# Patient Record
Sex: FEMALE | Race: WHITE | Employment: FULL TIME | URBAN - METROPOLITAN AREA
[De-identification: names, ages, dates, MRNs, and addresses within clinical notes are randomized per-mention and may not be internally consistent; named-entity substitution may affect disease eponyms.]

---

## 2019-11-03 ENCOUNTER — HOSPITAL ENCOUNTER (OUTPATIENT)
Age: 69
Discharge: ANOTHER HOSPITAL | End: 2019-11-03
Attending: EMERGENCY MEDICINE
Payer: COMMERCIAL

## 2019-11-03 ENCOUNTER — APPOINTMENT (OUTPATIENT)
Dept: GENERAL RADIOLOGY | Facility: HOSPITAL | Age: 69
DRG: 308 | End: 2019-11-03
Attending: EMERGENCY MEDICINE
Payer: MEDICARE

## 2019-11-03 ENCOUNTER — HOSPITAL ENCOUNTER (INPATIENT)
Facility: HOSPITAL | Age: 69
LOS: 2 days | Discharge: HOME | DRG: 308 | End: 2019-11-05
Attending: EMERGENCY MEDICINE | Admitting: INTERNAL MEDICINE
Payer: MEDICARE

## 2019-11-03 VITALS
WEIGHT: 137 LBS | DIASTOLIC BLOOD PRESSURE: 60 MMHG | TEMPERATURE: 97 F | HEART RATE: 158 BPM | OXYGEN SATURATION: 98 % | RESPIRATION RATE: 28 BRPM | SYSTOLIC BLOOD PRESSURE: 81 MMHG

## 2019-11-03 DIAGNOSIS — I48.91 ATRIAL FIBRILLATION, NEW ONSET (HCC): Primary | ICD-10-CM

## 2019-11-03 DIAGNOSIS — I48.91 ATRIAL FIBRILLATION WITH RAPID VENTRICULAR RESPONSE (HCC): ICD-10-CM

## 2019-11-03 DIAGNOSIS — I48.91 ATRIAL FIBRILLATION WITH RAPID VENTRICULAR RESPONSE (HCC): Primary | ICD-10-CM

## 2019-11-03 PROBLEM — R73.9 HYPERGLYCEMIA: Status: ACTIVE | Noted: 2019-11-03

## 2019-11-03 PROBLEM — R79.89 AZOTEMIA: Status: ACTIVE | Noted: 2019-11-03

## 2019-11-03 PROCEDURE — 99204 OFFICE O/P NEW MOD 45 MIN: CPT

## 2019-11-03 PROCEDURE — 93010 ELECTROCARDIOGRAM REPORT: CPT

## 2019-11-03 PROCEDURE — 99221 1ST HOSP IP/OBS SF/LOW 40: CPT | Performed by: INTERNAL MEDICINE

## 2019-11-03 PROCEDURE — 71045 X-RAY EXAM CHEST 1 VIEW: CPT | Performed by: EMERGENCY MEDICINE

## 2019-11-03 RX ORDER — HEPARIN SODIUM 1000 [USP'U]/ML
60 INJECTION, SOLUTION INTRAVENOUS; SUBCUTANEOUS ONCE
Status: COMPLETED | OUTPATIENT
Start: 2019-11-03 | End: 2019-11-03

## 2019-11-03 RX ORDER — SODIUM CHLORIDE 0.9 % (FLUSH) 0.9 %
3 SYRINGE (ML) INJECTION AS NEEDED
Status: DISCONTINUED | OUTPATIENT
Start: 2019-11-03 | End: 2019-11-05

## 2019-11-03 RX ORDER — DILTIAZEM HYDROCHLORIDE 5 MG/ML
5 INJECTION INTRAVENOUS ONCE
Status: DISCONTINUED | OUTPATIENT
Start: 2019-11-03 | End: 2019-11-03

## 2019-11-03 RX ORDER — HEPARIN SODIUM AND DEXTROSE 10000; 5 [USP'U]/100ML; G/100ML
INJECTION INTRAVENOUS CONTINUOUS
Status: DISCONTINUED | OUTPATIENT
Start: 2019-11-03 | End: 2019-11-05

## 2019-11-03 RX ORDER — 0.9 % SODIUM CHLORIDE 0.9 %
1000 INTRAVENOUS SOLUTION INTRAVENOUS ONCE
Status: COMPLETED | OUTPATIENT
Start: 2019-11-03 | End: 2019-11-03

## 2019-11-03 RX ORDER — HEPARIN SODIUM AND DEXTROSE 10000; 5 [USP'U]/100ML; G/100ML
12 INJECTION INTRAVENOUS ONCE
Status: COMPLETED | OUTPATIENT
Start: 2019-11-03 | End: 2019-11-03

## 2019-11-03 RX ORDER — ACETAMINOPHEN 325 MG/1
650 TABLET ORAL EVERY 6 HOURS PRN
Status: DISCONTINUED | OUTPATIENT
Start: 2019-11-03 | End: 2019-11-05

## 2019-11-03 NOTE — ED INITIAL ASSESSMENT (HPI)
2 weeks ago thought she had allergies and would wheeze at night for the past week she has wheezing and shortness of breath when she lays down and she cant fall asleep. Occasional chest pain.  Apical pulse >150 weak and thready skin warm dry a/o x3

## 2019-11-03 NOTE — ED NOTES
911 called for transport to ed . ekg endorsed to [place] and cardiac monitor applied. Agrees to go to the ed fo further eval and treatment.

## 2019-11-03 NOTE — ED PROVIDER NOTES
Patient Seen in: [facility] AND [facility] Emergency Department      History   Patient presents with:  Arrythmia/Palpitations (cardiovascular)    Stated Complaint: a-fib; rvr    HPI    She presents the emergency department complaining of shortness of breath whil General: Skin is warm and dry. Findings: No rash. Neurological:      Mental Status: She is alert and oriented to person, place, and time.                ED Course     Labs Reviewed   BASIC METABOLIC PANEL (8) - Abnormal; Notable for the following com within normal limits   CBC W/ DIFFERENTIAL - Abnormal; Notable for the following components:    RDW-SD 47.0 (*)     All other components within normal limits   CBC W/ DIFFERENTIAL - Abnormal; Notable for the following components:    RDW-SD 46.5 (*)     All Documentation    There were greater than 30 minutes of critical care time spent directly on this patient and this time did not include procedures but did include:    8 minutes for documentation  9 minutes for physical exam, re-examination and discussing re

## 2019-11-03 NOTE — ED PROVIDER NOTES
Patient Seen in: [facility] AND CLINICS Immediate Care In [address] [place] [address]      History   Patient presents with:  Cough/URI    Stated Complaint: sob/cough    HPI    The patient is a 78-year-old female with no significant past medical history presents now with ronald irregular  Abdomen: Soft, nontender and nondistended  Neurologic: Patient is awake, alert and oriented ×3.   The patient's motor strength is 5 out of 5 and symmetric in the upper and lower extremities bilaterally  Extremities: No focal swelling or tendernes

## 2019-11-03 NOTE — ED INITIAL ASSESSMENT (HPI)
Pt BIBEMS from urgent care d/t a-fib; rvr. Pt seen at  with c/o SOB x1 week. Not seen by PMD since 1992. Denies cp, HA, dizziness, n/v/d/f/c, abd pain. Denies PMH  AOx3, RR even/nonlabored.

## 2019-11-04 ENCOUNTER — APPOINTMENT (OUTPATIENT)
Dept: CV DIAGNOSTICS | Facility: HOSPITAL | Age: 69
DRG: 308 | End: 2019-11-04
Attending: INTERNAL MEDICINE
Payer: MEDICARE

## 2019-11-04 PROCEDURE — 93306 TTE W/DOPPLER COMPLETE: CPT | Performed by: INTERNAL MEDICINE

## 2019-11-04 PROCEDURE — 99233 SBSQ HOSP IP/OBS HIGH 50: CPT | Performed by: HOSPITALIST

## 2019-11-04 RX ORDER — DILTIAZEM HYDROCHLORIDE 240 MG/1
240 CAPSULE, COATED, EXTENDED RELEASE ORAL DAILY
Status: DISCONTINUED | OUTPATIENT
Start: 2019-11-04 | End: 2019-11-04

## 2019-11-04 RX ORDER — FAMOTIDINE 10 MG
10 TABLET ORAL 2 TIMES DAILY
Status: DISCONTINUED | OUTPATIENT
Start: 2019-11-04 | End: 2019-11-05

## 2019-11-04 RX ORDER — DILTIAZEM HYDROCHLORIDE 60 MG/1
60 TABLET, FILM COATED ORAL EVERY 6 HOURS SCHEDULED
Status: DISCONTINUED | OUTPATIENT
Start: 2019-11-04 | End: 2019-11-05

## 2019-11-04 NOTE — PLAN OF CARE
Problem: Patient Centered Care  Goal: Patient preferences are identified and integrated in the patient's plan of care  Description  Interventions:  - What would you like us to know as we care for you? Prefers to be called \"Mary\".   - Provide timely, com

## 2019-11-04 NOTE — CONSULTS
[facility]    Report of Consultation    Peter Clarke Patient Status:  Inpatient    1950 MRN [id number]   Location [facility] 3W/SW Attending Robert Lai MD   Hosp Day # 1 PCP No primary care provider on file.      Raji HPI.    Physical Exam:   Blood pressure 130/81, pulse 85, temperature 98.2 °F (36.8 °C), temperature source Oral, resp. rate 18, height 154.9 cm (5' 1\"), weight 134 lb (60.8 kg), SpO2 98 %. Intake/Output:   Last 3 shifts: I/O last 3 completed shifts:   In both costophrenic angles. No large airspace consolidation. Follow-up chest PA and lateral would be of the of help however.     Dictated by (CST): Isabela Wood MD on 11/03/2019 at 16:37     Approved by (CST): Isabela Wood MD on 11/03/2019 at 16:38

## 2019-11-04 NOTE — PROGRESS NOTES
[facility]    Progress Note    Jesika Marcano Patient Status:  Inpatient    1950 MRN [id number]   Location [facility] 3W/SW Attending Sergio Davidson MD   Hosp Day # 1 PCP No primary care provider on file.        Subjective 12-lead    Result Date: 11/3/2019  ECG Report  Interpretation  -------------------------- Atrial fibrillation -Nonspecific ST depression -Nondiagnostic. Low voltage -possible pulmonary disease.  ABNORMAL No previous ECGs available Electronically signed on

## 2019-11-04 NOTE — H&P
[facility]    History & Physical    Feroz Tang Patient Status:  Inpatient    1950 MRN [id number]   Location [facility] 3W/SW Attending Alexandr Young MD   Hosp Day # 0 PCP No primary care provider on file.      Date: atraumatic. Neck:  Supple, non-tender, no carotid bruit, no jugular venous distention, no lymphadenopathy, no thyromegaly.   Respiratory:  Lungs are clear to auscultation, respirations are non-labored, breath sounds are equal, symmetrical chest wall expans scarring/atelectasis. Darren Ogles blunting of both costophrenic angles.  No large airspace consolidation.  Follow-up chest PA and lateral would be of the of help however.            Assessment and Plan:  Mary Ku is a(n) 77year old female,with no known

## 2019-11-04 NOTE — PAYOR COMM NOTE
--------------  ADMISSION REVIEW     Payor: BCJAMI PP  Subscriber #:  [id number]  Authorization Number: [id number]    Admit date: 11/3/19  Admit time: 65       Admitting Physician: Dewey Ware MD  Attending Physician:  Ruby Garcia MD  Primary Care Negative. Respiratory:  Negative  Cardiovascular: palpitations sob  Gastrointestinal:  Negative. Genitourinary:  Negative  Endocrine:  Negative. Immunologic:  Negative. Musculoskeletal:  Negative. Integumentary:  Negative. Neurologic:  Negative.   Psy 11/03/19 1638   Order Status: Completed Updated: 11/03/19 1639   Narrative:     PROCEDURE: XR CHEST AP PORTABLE (CPT=71010)  TIME: 1538.       COMPARISON: None.     INDICATIONS: SOB today.  History of afib.     TECHNIQUE:   Single view.       FINDINGS:   CA Intravenous Karen Castaneda, RN      CefTRIAXone Sodium (ROCEPHIN) 1 g in sodium chloride 0.9% 100 mL MBP/ADD-vantage     Date Action Dose Route User    11/4/2019 0250 New Bag 1 g Intravenous Katia Lam RN      heparin (PORCINE) drip [id number]/2 .0 11/04/2019     CREATSERUM 0.88 11/04/2019     BUN 21 (H) 11/04/2019      11/04/2019     K 4.3 11/04/2019      11/04/2019     CO2 24.0 11/04/2019      (H) 11/04/2019     CA 8.3 (L) 11/04/2019     ALB 3.5 11/04/2019     ALKPHO 49 MD  11/04/19    Date/Time Temp Pulse Resp BP SpO2 Weight O2 Device O2 Flow Rate (L/min) Who   11/04/19 0957 98.2 °F (36.8 °C) 85 18 130/81 98 % — None (Room air) — AS   11/04/19 0507 — — — — — 134 lb — — MG   11/04/19 0245 98.5 °F (36.9 °C) 78 18 114/90 97

## 2019-11-05 VITALS
RESPIRATION RATE: 18 BRPM | DIASTOLIC BLOOD PRESSURE: 71 MMHG | HEIGHT: 61 IN | SYSTOLIC BLOOD PRESSURE: 130 MMHG | OXYGEN SATURATION: 98 % | BODY MASS INDEX: 24.8 KG/M2 | HEART RATE: 105 BPM | WEIGHT: 131.38 LBS | TEMPERATURE: 98 F

## 2019-11-05 PROCEDURE — 99239 HOSP IP/OBS DSCHRG MGMT >30: CPT | Performed by: HOSPITALIST

## 2019-11-05 RX ORDER — METOPROLOL SUCCINATE 50 MG/1
50 TABLET, EXTENDED RELEASE ORAL
Status: DISCONTINUED | OUTPATIENT
Start: 2019-11-06 | End: 2019-11-05

## 2019-11-05 RX ORDER — METOPROLOL SUCCINATE 50 MG/1
50 TABLET, EXTENDED RELEASE ORAL
Qty: 30 TABLET | Refills: 0 | Status: SHIPPED | OUTPATIENT
Start: 2019-11-06

## 2019-11-05 RX ORDER — FUROSEMIDE 20 MG/1
20 TABLET ORAL 2 TIMES DAILY
Qty: 30 TABLET | Refills: 0 | Status: SHIPPED | OUTPATIENT
Start: 2019-11-05

## 2019-11-05 RX ORDER — FUROSEMIDE 10 MG/ML
20 INJECTION INTRAMUSCULAR; INTRAVENOUS
Status: DISCONTINUED | OUTPATIENT
Start: 2019-11-05 | End: 2019-11-05

## 2019-11-05 RX ORDER — HYDRALAZINE HYDROCHLORIDE 20 MG/ML
10 INJECTION INTRAMUSCULAR; INTRAVENOUS EVERY 4 HOURS PRN
Status: DISCONTINUED | OUTPATIENT
Start: 2019-11-05 | End: 2019-11-05

## 2019-11-05 RX ORDER — DILTIAZEM HYDROCHLORIDE 240 MG/1
240 CAPSULE, COATED, EXTENDED RELEASE ORAL DAILY
Qty: 30 CAPSULE | Refills: 0 | Status: SHIPPED | OUTPATIENT
Start: 2019-11-05

## 2019-11-05 RX ORDER — METOPROLOL SUCCINATE 50 MG/1
50 TABLET, EXTENDED RELEASE ORAL
Qty: 30 TABLET | Refills: 0 | Status: SHIPPED | OUTPATIENT
Start: 2019-11-06 | End: 2019-11-05

## 2019-11-05 RX ORDER — METOPROLOL SUCCINATE 25 MG/1
25 TABLET, EXTENDED RELEASE ORAL
Status: DISCONTINUED | OUTPATIENT
Start: 2019-11-05 | End: 2019-11-05

## 2019-11-05 RX ORDER — FUROSEMIDE 20 MG/1
20 TABLET ORAL 2 TIMES DAILY
Qty: 30 TABLET | Refills: 0 | Status: SHIPPED | OUTPATIENT
Start: 2019-11-05 | End: 2019-11-05

## 2019-11-05 RX ORDER — METOPROLOL SUCCINATE 25 MG/1
25 TABLET, EXTENDED RELEASE ORAL ONCE
Status: COMPLETED | OUTPATIENT
Start: 2019-11-05 | End: 2019-11-05

## 2019-11-05 NOTE — PLAN OF CARE
Problem: Patient Centered Care  Goal: Patient preferences are identified and integrated in the patient's plan of care  Description  Interventions:  - What would you like us to know as we care for you? Prefers to be called \"Mary\".   - Provide timely, com Progressing

## 2019-11-05 NOTE — PAYOR COMM NOTE
--------------  CONTINUED STAY REVIEW    Payor: TRINI PPO  Subscriber #:  [id number]  Authorization Number: [id number]    Admit date: 11/3/19  Admit time: 65    Admitting Physician: Janis Miller MD  Attending Physician:  Dominique Weber MD  Primary Care 10 mg, Oral, BID  CefTRIAXone Sodium (ROCEPHIN) 1 g in sodium chloride 0.9% 100 mL MBP/ADD-vantage, 1 g, Intravenous, Q24H  Rivaroxaban (XARELTO) tab 20 mg, 20 mg, Oral, Daily with food  dilTIAZem HCl (CARDIZEM) tab 60 mg, 60 mg, Oral, 4 times per day  dil Intravenous Q24H   • rivaroxaban  20 mg Oral Daily with food   • dilTIAZem HCl  60 mg Oral 4 times per day         Continuous Infusions:   • diltiazem Stopped (11/03/19 2100)   • Continuous dose Heparin infusion 600 Units/hr (11/05/19 0307)         Patient blunting of both costophrenic angles. No large airspace consolidation. Follow-up chest PA and lateral would be of the of help however.     Dictated by (CST): Juanito Mcneil MD on 11/03/2019 at 16:37     Approved by (CST): Juanito Mcneil MD on 11/03/2019 a Caroline  11/5/2019           *This note was dictated, in part, using a computerized recognition system and may contain unintended errors.               Electronically signed by Dorys Blair MD at 11/5/2019  1:59 PM           MEDICATIONS ADMINISTERED IN LAST 1

## 2019-11-05 NOTE — PLAN OF CARE
Problem: Patient Centered Care  Goal: Patient preferences are identified and integrated in the patient's plan of care  Description  Interventions:  - What would you like us to know as we care for you? Prefers to be called \"Mary\".   - Provide timely, com indicated  - Evaluate effectiveness of antiarrhythmic and heart rate control medications as ordered  - Initiate emergency measures for life threatening arrhythmias  - Monitor electrolytes and administer replacement therapy as ordered  Outcome: Progressing

## 2019-11-05 NOTE — PROGRESS NOTES
Cardiology progress note    24 h events VS stable          Current Medications:  hydrALAzine HCl (APRESOLINE) injection 10 mg, 10 mg, Intravenous, Q4H PRN  Metoprolol Succinate ER (Toprol XL) 24 hr tab 25 mg, 25 mg, Oral, Daily Beta Blocker  furosemide (LA 11/04/2019    K 4.3 11/04/2019     11/04/2019    CO2 24.0 11/04/2019     (H) 11/04/2019    CA 8.3 (L) 11/04/2019    ALB 3.5 11/04/2019    ALKPHO 49 (L) 11/04/2019    TP 7.3 11/04/2019    AST 50 (H) 11/04/2019    ALT 73 (H) 11/04/2019    PTT 56 see her as in or out patient, then we will plan for a OVIDIO and LHC as outpatient in case she dose not improve clinically the most important is to repeat an echo while she is rate controlled to better look at her MR and LVEF as outpatient.  Patient will disch

## 2019-11-05 NOTE — DISCHARGE SUMMARY
[facility]    Discharge Summary    Khoa Prater Patient Status:  Inpatient    1950 MRN [id number]   Location [facility] 3W/SW Attending Libra Nicole MD   Hosp Day # 2 PCP No primary care provider on file.      Date of CHF  bnp elevated- 1319  Echo reviewed  Lasix 20 IV BID  Lasix 20 mg daily      UTI  Mod esterase with wbcs  Infection likely a trigger for afib  Treat with iv ceftriaxone  Await urine cx- negative- stop antibiotics      Prophylaxis  Heparin drip and famot Please  your prescriptions at the location directed by your doctor or nurse    Bring a paper prescription for each of these medications  · dilTIAZem HCl ER Coated Beads 240 MG Cp24  · furosemide 20 MG Tabs  · Metoprolol Succinate ER 50 MG Tb24

## 2019-11-06 NOTE — PAYOR COMM NOTE
--------------  DISCHARGE REVIEW    Payor: TRINI BANGURA  Subscriber #:  [id number]  Authorization Number: [id number]    Admit date: 11/3/19  Admit time:  1700  Discharge Date: 11/5/2019  6:35 PM     Admitting Physician: Dawson Lawson MD  Attending Physician: rhonchi. Cardiovascular: S1, S2. Regular rate and rhythm. No murmurs, rubs or gallops. Abdomen: Soft, nontender, nondistended. Positive bowel sounds. No rebound or guarding. Neurologic: No focal neurological deficits.    Musculoskeletal: Moves all extr Cp24  Commonly known as:  CARDIZEM CD      Take 1 capsule (240 mg total) by mouth daily. Quantity:  30 capsule  Refills:  0     furosemide 20 MG Tabs  Commonly known as:  LASIX      Take 1 tablet (20 mg total) by mouth 2 (two) times daily.    Quantity:  3

## 2022-05-12 ENCOUNTER — OFFICE VISIT (OUTPATIENT)
Dept: INTERNAL MEDICINE CLINIC | Facility: CLINIC | Age: 72
End: 2022-05-12
Payer: COMMERCIAL

## 2022-05-12 VITALS
HEART RATE: 84 BPM | SYSTOLIC BLOOD PRESSURE: 134 MMHG | OXYGEN SATURATION: 98 % | HEIGHT: 61 IN | WEIGHT: 125.88 LBS | DIASTOLIC BLOOD PRESSURE: 80 MMHG | TEMPERATURE: 99 F | BODY MASS INDEX: 23.77 KG/M2

## 2022-05-12 DIAGNOSIS — I48.20 CHRONIC A-FIB (HCC): Primary | ICD-10-CM

## 2022-05-12 DIAGNOSIS — E78.2 MIXED HYPERLIPIDEMIA: ICD-10-CM

## 2022-05-12 DIAGNOSIS — R79.89 ELEVATED LFTS: ICD-10-CM

## 2022-05-12 DIAGNOSIS — I10 PRIMARY HYPERTENSION: ICD-10-CM

## 2022-05-12 DIAGNOSIS — Z78.0 MENOPAUSE: ICD-10-CM

## 2022-05-12 DIAGNOSIS — Z53.20 MAMMOGRAM DECLINED: ICD-10-CM

## 2022-05-12 DIAGNOSIS — Z53.20 COLON CANCER SCREENING DECLINED: ICD-10-CM

## 2022-05-12 DIAGNOSIS — R73.01 IFG (IMPAIRED FASTING GLUCOSE): ICD-10-CM

## 2022-05-12 PROCEDURE — 3075F SYST BP GE 130 - 139MM HG: CPT | Performed by: INTERNAL MEDICINE

## 2022-05-12 PROCEDURE — 90471 IMMUNIZATION ADMIN: CPT | Performed by: INTERNAL MEDICINE

## 2022-05-12 PROCEDURE — 99214 OFFICE O/P EST MOD 30 MIN: CPT | Performed by: INTERNAL MEDICINE

## 2022-05-12 PROCEDURE — 3008F BODY MASS INDEX DOCD: CPT | Performed by: INTERNAL MEDICINE

## 2022-05-12 PROCEDURE — 3079F DIAST BP 80-89 MM HG: CPT | Performed by: INTERNAL MEDICINE

## 2022-05-12 PROCEDURE — 90677 PCV20 VACCINE IM: CPT | Performed by: INTERNAL MEDICINE

## 2022-05-12 RX ORDER — ATORVASTATIN CALCIUM 40 MG/1
40 TABLET, FILM COATED ORAL DAILY
COMMUNITY
Start: 2022-03-21 | End: 2022-05-12

## 2022-05-12 RX ORDER — DILTIAZEM HYDROCHLORIDE 240 MG/1
240 CAPSULE, COATED, EXTENDED RELEASE ORAL DAILY
Qty: 90 CAPSULE | Refills: 1 | Status: SHIPPED | OUTPATIENT
Start: 2022-05-12

## 2022-05-12 RX ORDER — LOSARTAN POTASSIUM 50 MG/1
50 TABLET ORAL DAILY
Qty: 90 TABLET | Refills: 1 | Status: SHIPPED | OUTPATIENT
Start: 2022-05-12

## 2022-05-12 RX ORDER — METOPROLOL SUCCINATE 100 MG/1
100 TABLET, EXTENDED RELEASE ORAL DAILY
Qty: 90 TABLET | Refills: 1 | Status: SHIPPED | OUTPATIENT
Start: 2022-05-12

## 2022-05-12 RX ORDER — FUROSEMIDE 40 MG/1
40 TABLET ORAL DAILY
Qty: 90 TABLET | Refills: 1 | Status: SHIPPED | OUTPATIENT
Start: 2022-05-12

## 2022-05-12 RX ORDER — LOSARTAN POTASSIUM 50 MG/1
50 TABLET ORAL DAILY
COMMUNITY
Start: 2022-02-06 | End: 2022-05-12

## 2022-05-12 RX ORDER — ATORVASTATIN CALCIUM 40 MG/1
40 TABLET, FILM COATED ORAL DAILY
Qty: 90 TABLET | Refills: 1 | Status: SHIPPED | OUTPATIENT
Start: 2022-05-12

## 2022-09-15 LAB
ALBUMIN/GLOBULIN RATIO: 1.3 (CALC) (ref 1–2.5)
ALBUMIN: 4.5 G/DL (ref 3.6–5.1)
ALKALINE PHOSPHATASE: 50 U/L (ref 37–153)
ALT: 31 U/L (ref 6–29)
AST: 27 U/L (ref 10–35)
BILIRUBIN, TOTAL: 0.7 MG/DL (ref 0.2–1.2)
BUN: 17 MG/DL (ref 7–25)
CALCIUM: 9.7 MG/DL (ref 8.6–10.4)
CARBON DIOXIDE: 29 MMOL/L (ref 20–32)
CHLORIDE: 103 MMOL/L (ref 98–110)
CHOL/HDLC RATIO: 2.9 (CALC)
CHOLESTEROL, TOTAL: 137 MG/DL
CREATININE: 0.9 MG/DL (ref 0.6–1)
EGFR: 68 ML/MIN/1.73M2
GLOBULIN: 3.4 G/DL (CALC) (ref 1.9–3.7)
GLUCOSE: 102 MG/DL (ref 65–99)
HDL CHOLESTEROL: 47 MG/DL
HEMATOCRIT: 42.4 % (ref 35–45)
HEMOGLOBIN A1C: 5.8 % OF TOTAL HGB
HEMOGLOBIN: 14.1 G/DL (ref 11.7–15.5)
LDL-CHOLESTEROL: 65 MG/DL (CALC)
MCH: 30.8 PG (ref 27–33)
MCHC: 33.3 G/DL (ref 32–36)
MCV: 92.6 FL (ref 80–100)
MPV: 8.9 FL (ref 7.5–12.5)
NON-HDL CHOLESTEROL: 90 MG/DL (CALC)
PLATELET COUNT: 284 THOUSAND/UL (ref 140–400)
POTASSIUM: 5.2 MMOL/L (ref 3.5–5.3)
PROTEIN, TOTAL: 7.9 G/DL (ref 6.1–8.1)
RDW: 12.4 % (ref 11–15)
RED BLOOD CELL COUNT: 4.58 MILLION/UL (ref 3.8–5.1)
SODIUM: 140 MMOL/L (ref 135–146)
TRIGLYCERIDES: 178 MG/DL
WHITE BLOOD CELL COUNT: 8.4 THOUSAND/UL (ref 3.8–10.8)

## 2022-09-18 ENCOUNTER — TELEPHONE (OUTPATIENT)
Dept: INTERNAL MEDICINE CLINIC | Facility: CLINIC | Age: 72
End: 2022-09-18

## 2022-09-18 DIAGNOSIS — R79.89 ELEVATED LFTS: Primary | ICD-10-CM

## 2022-09-20 NOTE — TELEPHONE ENCOUNTER
Patient was left a message to call back. Transfer to triage dept [id number]  See result notes. Closing this encounter.

## 2022-10-03 ENCOUNTER — HOSPITAL ENCOUNTER (OUTPATIENT)
Dept: BONE DENSITY | Age: 72
Discharge: HOME | End: 2022-10-03
Attending: INTERNAL MEDICINE
Payer: COMMERCIAL

## 2022-10-03 ENCOUNTER — LAB ENCOUNTER (OUTPATIENT)
Dept: LAB | Age: 72
End: 2022-10-03
Attending: INTERNAL MEDICINE
Payer: COMMERCIAL

## 2022-10-03 DIAGNOSIS — R79.89 ELEVATED LFTS: Primary | ICD-10-CM

## 2022-10-03 DIAGNOSIS — Z78.0 MENOPAUSE: ICD-10-CM

## 2022-10-03 LAB
A1AT SERPL-MCNC: 124 MG/DL (ref 90–200)
CERULOPLASMIN SERPL-MCNC: 25.5 MG/DL (ref 20–60)
DEPRECATED HBV CORE AB SER IA-ACNC: 130.3 NG/ML
HBV CORE AB SERPL QL IA: NONREACTIVE
HBV SURFACE AB SER QL: NONREACTIVE
HBV SURFACE AB SERPL IA-ACNC: <3.1 MIU/ML
HBV SURFACE AG SER-ACNC: <0.1 [IU]/L
HBV SURFACE AG SERPL QL IA: NONREACTIVE
HCV AB SERPL QL IA: NONREACTIVE
IRON SATN MFR SERPL: 42 %
IRON SERPL-MCNC: 154 UG/DL
TIBC SERPL-MCNC: 368 UG/DL (ref 240–450)
TRANSFERRIN SERPL-MCNC: 247 MG/DL (ref 200–360)

## 2022-10-03 PROCEDURE — 82390 ASSAY OF CERULOPLASMIN: CPT | Performed by: INTERNAL MEDICINE

## 2022-10-03 PROCEDURE — 86364 TISS TRNSGLTMNASE EA IG CLAS: CPT | Performed by: INTERNAL MEDICINE

## 2022-10-03 PROCEDURE — 86704 HEP B CORE ANTIBODY TOTAL: CPT | Performed by: INTERNAL MEDICINE

## 2022-10-03 PROCEDURE — 86225 DNA ANTIBODY NATIVE: CPT

## 2022-10-03 PROCEDURE — 84466 ASSAY OF TRANSFERRIN: CPT | Performed by: INTERNAL MEDICINE

## 2022-10-03 PROCEDURE — 86256 FLUORESCENT ANTIBODY TITER: CPT

## 2022-10-03 PROCEDURE — 82103 ALPHA-1-ANTITRYPSIN TOTAL: CPT

## 2022-10-03 PROCEDURE — 86706 HEP B SURFACE ANTIBODY: CPT | Performed by: INTERNAL MEDICINE

## 2022-10-03 PROCEDURE — 36415 COLL VENOUS BLD VENIPUNCTURE: CPT | Performed by: INTERNAL MEDICINE

## 2022-10-03 PROCEDURE — 82728 ASSAY OF FERRITIN: CPT | Performed by: INTERNAL MEDICINE

## 2022-10-03 PROCEDURE — 87340 HEPATITIS B SURFACE AG IA: CPT | Performed by: INTERNAL MEDICINE

## 2022-10-03 PROCEDURE — 86038 ANTINUCLEAR ANTIBODIES: CPT

## 2022-10-03 PROCEDURE — 83540 ASSAY OF IRON: CPT | Performed by: INTERNAL MEDICINE

## 2022-10-03 PROCEDURE — 86803 HEPATITIS C AB TEST: CPT | Performed by: INTERNAL MEDICINE

## 2022-10-03 PROCEDURE — 77080 DXA BONE DENSITY AXIAL: CPT | Performed by: INTERNAL MEDICINE

## 2022-10-04 LAB
SMOOTH MUSCLE AB TITR SER: <20 {TITER}
TTG IGA SER-ACNC: 1.2 U/ML (ref ?–7)

## 2022-10-07 LAB
DSDNA IGG SERPL IA-ACNC: 2.1 IU/ML
ENA AB SER QL IA: 0.2 UG/L
ENA AB SER QL IA: NEGATIVE

## 2022-10-09 ENCOUNTER — TELEPHONE (OUTPATIENT)
Dept: INTERNAL MEDICINE CLINIC | Facility: CLINIC | Age: 72
End: 2022-10-09

## 2022-10-09 DIAGNOSIS — R79.89 ELEVATED LFTS: Primary | ICD-10-CM

## 2022-11-29 RX ORDER — ATORVASTATIN CALCIUM 40 MG/1
40 TABLET, FILM COATED ORAL DAILY
Qty: 90 TABLET | Refills: 1 | Status: SHIPPED | OUTPATIENT
Start: 2022-11-29

## 2022-11-29 NOTE — TELEPHONE ENCOUNTER
Refill passed per [address] protocol.      Requested Prescriptions   Pending Prescriptions Disp Refills    ATORVASTATIN 40 MG Oral Tab [Pharmacy Med Name: Atorvastatin Calcium 40 Mg Tab Nort] 90 tablet 0     Sig: TAKE ONE TABLET BY MOUTH ONE TIME DAILY       Cholesterol Medication Protocol Passed - 11/28/2022 11:26 AM        Passed - ALT in past 12 months        Passed - LDL in past 12 months        Passed - Last ALT < 80     Lab Results   Component Value Date    ALT 31 (H) 09/14/2022             Passed - Last LDL < 130     Lab Results   Component Value Date    LDL 65 09/14/2022             Passed - In person appointment or virtual visit in the past 12 mos or appointment in next 3 mos     [facility]              [facility]    [address] Arletta Men, MD    Office Visit          Future Appointments         Provider Department Appt Notes    In 1 month Osmin Cortes MD [address] [facility] follow up check                     Recent Outpatient Visits              [facility]    [address] Arletta Men, MD    Office Visit             Future Appointments         Provider Department Appt Notes    In 1 month Osmin Cortes MD [address] [facility] follow up check

## 2022-12-19 RX ORDER — DILTIAZEM HYDROCHLORIDE 240 MG/1
CAPSULE, COATED, EXTENDED RELEASE ORAL
Qty: 90 CAPSULE | Refills: 0 | Status: SHIPPED | OUTPATIENT
Start: 2022-12-19

## 2023-01-04 ENCOUNTER — OFFICE VISIT (OUTPATIENT)
Dept: INTERNAL MEDICINE CLINIC | Facility: CLINIC | Age: 73
End: 2023-01-04
Payer: COMMERCIAL

## 2023-01-04 VITALS
WEIGHT: 129.88 LBS | TEMPERATURE: 99 F | HEIGHT: 61 IN | DIASTOLIC BLOOD PRESSURE: 72 MMHG | HEART RATE: 90 BPM | OXYGEN SATURATION: 97 % | SYSTOLIC BLOOD PRESSURE: 121 MMHG | BODY MASS INDEX: 24.52 KG/M2

## 2023-01-04 DIAGNOSIS — Z12.11 COLON CANCER SCREENING: ICD-10-CM

## 2023-01-04 DIAGNOSIS — E78.2 MIXED HYPERLIPIDEMIA: ICD-10-CM

## 2023-01-04 DIAGNOSIS — I10 PRIMARY HYPERTENSION: ICD-10-CM

## 2023-01-04 DIAGNOSIS — I48.20 CHRONIC A-FIB (HCC): ICD-10-CM

## 2023-01-04 DIAGNOSIS — Z53.20 MAMMOGRAM DECLINED: ICD-10-CM

## 2023-01-04 DIAGNOSIS — R73.01 IFG (IMPAIRED FASTING GLUCOSE): ICD-10-CM

## 2023-01-04 DIAGNOSIS — M85.859 OSTEOPENIA OF NECK OF FEMUR, UNSPECIFIED LATERALITY: ICD-10-CM

## 2023-01-04 DIAGNOSIS — Z00.00 ANNUAL PHYSICAL EXAM: Primary | ICD-10-CM

## 2023-01-04 DIAGNOSIS — R79.89 ELEVATED LFTS: ICD-10-CM

## 2023-01-04 PROBLEM — Z78.0 MENOPAUSE: Status: RESOLVED | Noted: 2022-05-12 | Resolved: 2023-01-04

## 2023-01-04 PROBLEM — R73.9 HYPERGLYCEMIA: Status: RESOLVED | Noted: 2019-11-03 | Resolved: 2023-01-04

## 2023-01-04 PROCEDURE — 3074F SYST BP LT 130 MM HG: CPT | Performed by: INTERNAL MEDICINE

## 2023-01-04 PROCEDURE — 90471 IMMUNIZATION ADMIN: CPT | Performed by: INTERNAL MEDICINE

## 2023-01-04 PROCEDURE — 3078F DIAST BP <80 MM HG: CPT | Performed by: INTERNAL MEDICINE

## 2023-01-04 PROCEDURE — 99397 PER PM REEVAL EST PAT 65+ YR: CPT | Performed by: INTERNAL MEDICINE

## 2023-01-04 PROCEDURE — 90662 IIV NO PRSV INCREASED AG IM: CPT | Performed by: INTERNAL MEDICINE

## 2023-01-04 PROCEDURE — 3008F BODY MASS INDEX DOCD: CPT | Performed by: INTERNAL MEDICINE

## 2023-03-20 NOTE — TELEPHONE ENCOUNTER
Please review. Protocol failed / No protocol. Requested Prescriptions   Pending Prescriptions Disp Refills    DILTIAZEM  MG Oral Capsule SR 24 Hr [Pharmacy Med Name: Diltiazem Hydrochloride Er 24hr 240 Mg Cap Sunp] 90 capsule 0     Sig: TAKE ONE CAPSULE BY MOUTH ONE TIME DAILY       Hypertensive Medications Protocol Failed - 3/18/2023 10:46 AM        Failed - CMP or BMP in past 6 months     No results found for this or any previous visit (from the past 4392 hour(s)).             Failed - EGFRCR or GFRNAA > 50     GFR Evaluation            Passed - In person appointment in the past 12 or next 3 months     Recent Outpatient Visits              2 months ago Annual physical exam    [address], [place], Jerad Anderson MD    Office Visit    10 months ago [facility])    [address] [place], Jerad Anderson MD    Office Visit                      Passed - Last BP reading less than 140/90     BP Readings from Last 1 Encounters:  01/04/23 : 121/72              Passed - In person appointment or virtual visit in the past 6 months     Recent Outpatient Visits              2 months ago Annual physical exam    Jerad Lopez MD    Office Visit    10 months ago [facility])    Jerad Lopez MD    Office Visit                            Recent Outpatient Visits              2 months ago Annual physical exam    Jerad Lopez MD    Office Visit    10 months ago [facility])    [address], [place], Jerad Anderson MD    Office Visit

## 2023-03-21 RX ORDER — DILTIAZEM HYDROCHLORIDE 240 MG/1
CAPSULE, COATED, EXTENDED RELEASE ORAL
Qty: 90 CAPSULE | Refills: 0 | Status: SHIPPED | OUTPATIENT
Start: 2023-03-21

## 2023-05-03 RX ORDER — LOSARTAN POTASSIUM 50 MG/1
TABLET ORAL
Qty: 90 TABLET | Refills: 0 | Status: SHIPPED | OUTPATIENT
Start: 2023-05-03

## 2023-05-03 RX ORDER — METOPROLOL SUCCINATE 100 MG/1
TABLET, EXTENDED RELEASE ORAL
Qty: 90 TABLET | Refills: 0 | Status: SHIPPED | OUTPATIENT
Start: 2023-05-03

## 2023-05-03 RX ORDER — FUROSEMIDE 40 MG/1
TABLET ORAL
Qty: 90 TABLET | Refills: 0 | Status: SHIPPED | OUTPATIENT
Start: 2023-05-03

## 2023-06-06 RX ORDER — DILTIAZEM HYDROCHLORIDE 240 MG/1
240 CAPSULE, COATED, EXTENDED RELEASE ORAL DAILY
Qty: 90 CAPSULE | Refills: 1 | Status: SHIPPED | OUTPATIENT
Start: 2023-06-06

## 2023-06-06 NOTE — TELEPHONE ENCOUNTER
Protocol failed or has No Protocol, please review  Requested Prescriptions   Pending Prescriptions Disp Refills    DILTIAZEM  MG Oral Capsule SR 24 Hr [Pharmacy Med Name: Diltiazem Hydrochloride Er 24hr 240 Mg Cap Sunp] 90 capsule 0     Sig: TAKE ONE CAPSULE BY MOUTH ONE TIME DAILY       Hypertensive Medications Protocol Failed - 6/5/2023  2:17 PM        Failed - CMP or BMP in past 6 months     No results found for this or any previous visit (from the past 4392 hour(s)).               Passed - In person appointment in the past 12 or next 3 months     Recent Outpatient Visits              5 months ago Annual physical exam    Jeff Gonzalez MD    Office Visit    1 year ago [facility])    [address] Fonsec[address] Shelva Memo, MD    Office Visit                      Passed - Last BP reading less than 140/90     BP Readings from Last 1 Encounters:  01/04/23 : 121/72                Passed - In person appointment or virtual visit in the past 6 months     Recent Outpatient Visits              5 months ago Annual physical exam    Jeff Gonzalez MD    Office Visit    1 year ago [facility])    [place] Fonsec[address] Shelva Memo, MD    Office Visit                      Passed - EGFRCR or GFRNAA > 50     GFR Evaluation                   Recent Outpatient Visits              5 months ago Annual physical exam    Jeff Gonzalez MD    Office Visit    1 year ago [facility])    [address] Fon[address] Shelva Memo, MD    Office Visit

## 2023-07-03 RX ORDER — ATORVASTATIN CALCIUM 40 MG/1
40 TABLET, FILM COATED ORAL DAILY
Qty: 90 TABLET | Refills: 3 | Status: SHIPPED | OUTPATIENT
Start: 2023-07-03

## 2023-07-19 ENCOUNTER — NURSE TRIAGE (OUTPATIENT)
Dept: INTERNAL MEDICINE CLINIC | Facility: CLINIC | Age: 73
End: 2023-07-19

## 2023-07-19 NOTE — TELEPHONE ENCOUNTER
Agree/ she needs to be seen at least go to IC; not all swelling in the joint like the knee is gout. Could be other types of athritis, the worse could be septic joint or infection of the joint that can present similarly. She can not  take nsaids since she is on xarelto.

## 2023-07-19 NOTE — TELEPHONE ENCOUNTER
Please reply to pool: EM RN TRIAGE  Action Requested: Summary for Provider     []  Critical Lab, Recommendations Needed  [x] Need Additional Advice  []   FYI    []   Need Orders  [] Need Medications Sent to Pharmacy  []  Other     SUMMARY: Patient contacts clinic reporting left knee pain, redness and swelling. She believes she has gout based on internet search of symptoms. She is requesting medications to \"lower my uric acid level\". Patient has never been diagnosed with gout. Swelling is localized to knee, denies leg swelling, chest pain, shortness of breath, fever, body aches or chills. I advised the patient she needs to be seen, she adamantly refuses appointment. States she cannot ambulate due to pain, she has to walk with a cane. I advised multiple times that her symptoms need to be assessed. She continues to refuse. She will continue with OTC advil for her symptoms. Dr. Abram Gomez.     Reason for call: Acute  Onset: Data Unavailable                       Reason for Disposition   SEVERE pain (e.g., excruciating, unable to walk)    Protocols used: Knee Pain-A-[place]

## 2023-07-19 NOTE — TELEPHONE ENCOUNTER
Informed patient of advice per Dr. Jim Krueger.  States the Advil \"is helping my pain. \"  Reiterated that she is not to take as it interferes with Xarelto. Patient states her pain is \"not that bad today\" and denies wanting to go to ER or IC. Offered appointment this week with a provider, but refused.

## 2023-07-31 RX ORDER — LOSARTAN POTASSIUM 50 MG/1
50 TABLET ORAL EVERY MORNING
Qty: 90 TABLET | Refills: 0 | Status: SHIPPED | OUTPATIENT
Start: 2023-07-31

## 2023-07-31 RX ORDER — FUROSEMIDE 40 MG/1
40 TABLET ORAL EVERY MORNING
Qty: 90 TABLET | Refills: 0 | Status: SHIPPED | OUTPATIENT
Start: 2023-07-31

## 2023-07-31 RX ORDER — METOPROLOL SUCCINATE 100 MG/1
100 TABLET, EXTENDED RELEASE ORAL EVERY MORNING
Qty: 90 TABLET | Refills: 0 | Status: SHIPPED | OUTPATIENT
Start: 2023-07-31

## 2023-07-31 NOTE — TELEPHONE ENCOUNTER
Please review. Protocol failed / Has no protocol. Requested Prescriptions   Pending Prescriptions Disp Refills    METOPROLOL SUCCINATE  MG Oral Tablet 24 Hr [Pharmacy Med Name: Metoprolol Succinate Er 24hr 100 Mg Tab Nort] 90 tablet 0     Sig: Take 1 tablet  by mouth in the morning       Hypertensive Medications Protocol Failed - 7/29/2023 10:34 AM        Failed - CMP or BMP in past 6 months     No results found for this or any previous visit (from the past 4392 hour(s)). Failed - In person appointment or virtual visit in the past 6 months     Recent Outpatient Visits              6 months ago Annual physical exam    [address] [place] [address], Bethanie Cabot, MD    Office Visit    1 year ago [facility])    [place], Bethanie Cabot, MD    Office Visit                      Passed - In person appointment in the past 12 or next 3 months     Recent Outpatient Visits              6 months ago Annual physical exam    [place], Bethanie Cabot, MD    Office Visit    1 year ago [facility])    [place] Willis, Bethanie Cabot, MD    Office Visit                      Passed - Last BP reading less than 140/90     BP Readings from Last 1 Encounters:  01/04/23 : 121/72              Passed - EGFRCR or GFRNAA > 50     GFR Evaluation              FUROSEMIDE 40 MG Oral Tab [Pharmacy Med Name: Furosemide 40 Mg Tab Solc] 90 tablet 0     Sig: Take 1 tablet by mouth in the morning. Hypertensive Medications Protocol Failed - 7/29/2023 10:34 AM        Failed - CMP or BMP in past 6 months     No results found for this or any previous visit (from the past 4392 hour(s)).             Failed - In person appointment or virtual visit in the past 6 months     Recent Outpatient Visits              6 months ago Annual physical [address]    Office Visit    1 year ago [facility])    [address], [place] [address], Lynette Lou MD    Office Visit                      Passed - In person appointment in the past 12 or next 3 months     Recent Outpatient Visits              6 months ago Annual physical exam    [address], [address], Lynette Lou MD    Office Visit    1 year ago [facility])    [address], [address] [address], Lynette Lou MD    Office Visit                      Passed - Last BP reading less than 140/90     BP Readings from Last 1 Encounters:  01/04/23 : 121/72              Passed - EGFRCR or GFRNAA > 50     GFR Evaluation              LOSARTAN 50 MG Oral Tab [Pharmacy Med Name: Losartan Potassium 50 Mg Tab Camb] 90 tablet 0     Sig: Take 1 tablet by mouth in the morning. Hypertensive Medications Protocol Failed - 7/29/2023 10:34 AM        Failed - CMP or BMP in past 6 months     No results found for this or any previous visit (from the past 4392 hour(s)).             Failed - In person appointment or virtual visit in the past 6 months     Recent Outpatient Visits              6 months ago Annual physical exam    [address] MD    Office Visit    1 year ago [facility])    [address] MD    Office Visit                      Passed - In person appointment in the past 12 or next 3 months     Recent Outpatient Visits              6 months ago Annual physical exam    [address] Zulu[address] MD    Office Visit    1 year ago [facility])    [address] Z[address] MD    Office Visit                      Passed - Last BP reading less than 140/90     BP Readings from Last 1 Encounters:  01/04/23 : 121/72              Passed - EGFRCR or GFRNAA > 50     GFR Evaluation                  Recent Outpatient Visits              6 months ago Annual physical exam    Kristal Boyle MD    Office Visit    1 year ago Chronic a-fib [facility])    Kristal Boyle MD    Office Visit

## 2023-09-01 ENCOUNTER — OFFICE VISIT (OUTPATIENT)
Dept: INTERNAL MEDICINE CLINIC | Facility: CLINIC | Age: 73
End: 2023-09-01

## 2023-09-01 VITALS
HEART RATE: 61 BPM | HEIGHT: 61 IN | BODY MASS INDEX: 23.2 KG/M2 | WEIGHT: 122.88 LBS | OXYGEN SATURATION: 99 % | TEMPERATURE: 99 F | DIASTOLIC BLOOD PRESSURE: 76 MMHG | SYSTOLIC BLOOD PRESSURE: 126 MMHG

## 2023-09-01 DIAGNOSIS — R73.01 IFG (IMPAIRED FASTING GLUCOSE): ICD-10-CM

## 2023-09-01 DIAGNOSIS — M85.859 OSTEOPENIA OF NECK OF FEMUR, UNSPECIFIED LATERALITY: ICD-10-CM

## 2023-09-01 DIAGNOSIS — Z53.20 MAMMOGRAM DECLINED: ICD-10-CM

## 2023-09-01 DIAGNOSIS — I48.20 CHRONIC A-FIB (HCC): ICD-10-CM

## 2023-09-01 DIAGNOSIS — R79.89 ELEVATED LFTS: ICD-10-CM

## 2023-09-01 DIAGNOSIS — I10 PRIMARY HYPERTENSION: Primary | ICD-10-CM

## 2023-09-01 DIAGNOSIS — E78.2 MIXED HYPERLIPIDEMIA: ICD-10-CM

## 2023-09-01 DIAGNOSIS — M10.062 ACUTE IDIOPATHIC GOUT OF LEFT KNEE: ICD-10-CM

## 2023-09-01 DIAGNOSIS — I34.0 MITRAL VALVE INSUFFICIENCY, UNSPECIFIED ETIOLOGY: ICD-10-CM

## 2023-09-01 PROCEDURE — 3078F DIAST BP <80 MM HG: CPT | Performed by: INTERNAL MEDICINE

## 2023-09-01 PROCEDURE — 3008F BODY MASS INDEX DOCD: CPT | Performed by: INTERNAL MEDICINE

## 2023-09-01 PROCEDURE — 99214 OFFICE O/P EST MOD 30 MIN: CPT | Performed by: INTERNAL MEDICINE

## 2023-09-01 PROCEDURE — 3074F SYST BP LT 130 MM HG: CPT | Performed by: INTERNAL MEDICINE

## 2023-10-27 NOTE — TELEPHONE ENCOUNTER
Please review; protocol failed. Message sent for patient to have labs done. Requested Prescriptions   Pending Prescriptions Disp Refills    LOSARTAN 50 MG Oral Tab [Pharmacy Med Name: Losartan Potassium 50 Mg Tab Camb] 90 tablet 0     Sig: TAKE 1 TABLET BY MOUTH EVERY MORNING       Hypertensive Medications Protocol Failed - 10/26/2023  9:51 AM        Failed - CMP or BMP in past 6 months     No results found for this or any previous visit (from the past 4392 hour(s)).             Failed - EGFRCR or GFRNAA > 50     GFR Evaluation            Passed - In person appointment in the past 12 or next 3 months     Recent Outpatient Visits              1 month ago Primary hypertension    Rowan Gómez Edrick Servant, MD    Office Visit    9 months ago Annual physical exam    Raza Vela MD    Office Visit    [facility]    Rowan Gómez Edrick Servant, MD    Office Visit                      Passed - Last BP reading less than 140/90     BP Readings from Last 1 Encounters:  09/01/23 : 126/76              Passed - In person appointment or virtual visit in the past 6 months     Recent Outpatient Visits              1 month ago Primary hypertension    Raza Vela MD    Office Visit    9 months ago Annual physical exam    Raza Vela MD    Office Visit    [facility]    Raza Vela MD    Office Visit                        FUROSEMIDE 40 MG Oral Tab [Pharmacy Med Name: Furosemide 40 Mg Tab Risi] 90 tablet 0     Sig: TAKE 1 TABLET BY MOUTH EVERY MORNING       Hypertensive Medications Protocol Failed - 10/26/2023  9:51 AM        Failed - CMP or BMP in past 6 months     No results found for this or any previous visit (from the past 4392 hour(s)). Failed - EGFRCR or GFRNAA > 50     GFR Evaluation            Passed - In person appointment in the past 12 or next 3 months     Recent Outpatient Visits              1 month ago Primary hypertension    [address], [place], Mayte Donahue MD    Office Visit    9 months ago Annual physical exam    [address] [facility], Mayte Donahue MD    Office Visit    1 year ago [facility]    [address], [place], Mayte Donahue MD    Office Visit                      Passed - Last BP reading less than 140/90     BP Readings from Last 1 Encounters:  09/01/23 : 126/76              Passed - In person appointment or virtual visit in the past 6 months     Recent Outpatient Visits              1 month ago Primary hypertension    [address], [place], Mayte Donahue MD    Office Visit    9 months ago Annual physical exam    [address] [facility], Mayte Donahue MD    Office Visit    1 year ago [facility]    [address], [place], Mayte Donahue MD    Office Visit                        METOPROLOL SUCCINATE  MG Oral Tablet [facility] [Pharmacy Med Name: Metoprolol Succinate Er 24hr 100 Mg Tab Nort] 90 tablet 0     Sig: TAKE 1 TABLET BY MOUTH EVERY MORNING       Hypertensive Medications Protocol Failed - 10/26/2023  9:51 AM        Failed - CMP or BMP in past 6 months     No results found for this or any previous visit (from the past 4392 hour(s)).             Failed - EGFRCR or GFRNAA > 50     GFR Evaluation            Passed - In person appointment in the past 12 or next 3 months     Recent Outpatient Visits              1 month ago Primary hypertension    [address], [place], Brian Greenwood MD    Office Visit    9 months ago Annual physical exam    Brian Hernandez MD    Office Visit    1 year ago Chronic a-fib [facility])    [address] Manuel Luis MD    Office Visit                      Passed - Last BP reading less than 140/90     BP Readings from Last 1 Encounters:  09/01/23 : 126/76              Passed - In person appointment or virtual visit in the past 6 months     Recent Outpatient Visits              1 month ago Primary hypertension    [address], Brian Greenwood MD    Office Visit    9 months ago Annual physical exam    Brian Hernandez MD    Office Visit    1 year ago [facility] a-fib [facility])    Brian Hernandez MD    Office Visit                         Recent Outpatient Visits              1 month ago Primary hypertension    Brian Hernandez MD    Office Visit    9 months ago Annual physical exam    Brian Hernandez MD    Office Visit    1 year ago [facility] a-fib [facility])    [address], [address], Brian Greenwood MD    Office Visit

## 2023-10-28 RX ORDER — LOSARTAN POTASSIUM 50 MG/1
50 TABLET ORAL EVERY MORNING
Qty: 90 TABLET | Refills: 0 | Status: SHIPPED | OUTPATIENT
Start: 2023-10-28

## 2023-10-28 RX ORDER — FUROSEMIDE 40 MG/1
40 TABLET ORAL EVERY MORNING
Qty: 90 TABLET | Refills: 0 | Status: SHIPPED | OUTPATIENT
Start: 2023-10-28

## 2023-10-28 RX ORDER — METOPROLOL SUCCINATE 100 MG/1
100 TABLET, EXTENDED RELEASE ORAL EVERY MORNING
Qty: 90 TABLET | Refills: 0 | Status: SHIPPED | OUTPATIENT
Start: 2023-10-28

## 2023-12-04 NOTE — TELEPHONE ENCOUNTER
Please review; protocol failed. Requested Prescriptions   Pending Prescriptions Disp Refills    DILTIAZEM  MG Oral Capsule SR 24 Hr [Pharmacy Med Name: Diltiazem Hydrochloride Er 24hr 240 Mg Cap Harini] 90 capsule 0     Sig: Take 1 capsule (240 mg total) by mouth daily. Hypertensive Medications Protocol Failed - 12/2/2023  1:07 PM        Failed - CMP or BMP in past 6 months     No results found for this or any previous visit (from the past 4392 hour(s)).             Failed - EGFRCR or GFRNAA > 50     GFR Evaluation            Passed - In person appointment in the past 12 or next 3 months     Recent Outpatient Visits              3 months ago Primary hypertension    [address], [place], Sally Lopez MD    Office Visit    11 months ago Annual physical exam    Sally Mena MD    Office Visit    1 year ago [facility])    [address], [address] [address], Sally Lopez MD    Office Visit                      Passed - Last BP reading less than 140/90     BP Readings from Last 1 Encounters:   09/01/23 126/76               Passed - In person appointment or virtual visit in the past 6 months     Recent Outpatient Visits              3 months ago Primary hypertension    Sally Mena MD    Office Visit    11 months ago Annual physical exam    Sally Mena MD    Office Visit    1 year ago [facility])    Sally Mena MD    Office Visit                           Recent Outpatient Visits              3 months ago Primary hypertension    Sally Mena MD    Office Visit    11 months ago Annual physical exam    [address], [address], Bethanie Cabot, MD    Office Visit    1 year ago Chronic a-fib [facility])    Rosie Yanez, Bethanie Cabot, MD    Office Visit

## 2023-12-05 RX ORDER — DILTIAZEM HYDROCHLORIDE 240 MG/1
240 CAPSULE, COATED, EXTENDED RELEASE ORAL DAILY
Qty: 90 CAPSULE | Refills: 1 | Status: SHIPPED | OUTPATIENT
Start: 2023-12-05

## 2024-01-25 NOTE — TELEPHONE ENCOUNTER
Please review; protocol failed/No Protocol  No labs pended yet   Requested Prescriptions   Pending Prescriptions Disp Refills    LOSARTAN 50 MG Oral Tab [Pharmacy Med Name: Losartan Potassium 50 Mg Tab Camb] 90 tablet 0     Sig: TAKE 1 TABLET BY MOUTH EVERY MORNING       Hypertensive Medications Protocol Failed - 1/24/2024 10:43 AM        Failed - CMP or BMP in past 6 months     No results found for this or any previous visit (from the past 4392 hour(s)).            Failed - EGFRCR or GFRNAA > 50     GFR Evaluation            Passed - In person appointment in the past 12 or next 3 months     Recent Outpatient Visits              4 months ago Primary hypertension    [facility], Cu[place]oc Guy MD    Office Visit    1 year ago Annual physical exam    [facility], Cuau[place]emoc Guy MD    Office Visit    1 year ago [facility]CC)    [facility]    Office Visit                      Passed - Last BP reading less than 140/90     BP Readings from Last 1 Encounters:   09/01/23 126/76               Passed - In person appointment or virtual visit in the past 6 months     Recent Outpatient Visits              4 months ago Primary hypertension    [facility], Cuauhtemoc Guy MD    Office Visit    1 year ago Annual physical exam    [facility]    Office Visit    1 year ago [facility]CC)    [facility]    [facility]                        FUROSEMIDE 40 MG Oral Tab [Pharmacy Med Name: Furosemide 40 Mg Tab Risi] 90 tablet 0     Sig: TAKE 1 TABLET BY MOUTH EVERY MORNING       Hypertensive Medications Protocol Failed - 1/24/2024 10:43 AM        Failed - CMP or BMP in past 6 months     No results  found for this or any previous visit (from the past 4392 hour(s)).            Failed - EGFRCR or GFRNAA > 50     GFR Evaluation            Passed - In person appointment in the past 12 or next 3 months     Recent Outpatient Visits              4 months ago Primary hypertension    [facility], [place]oc Guy MD    Office Visit    1 year ago Annual physical exam    [facility] Cu[place]emoc Guy MD    Office Visit    1 year ago Chronic a-fib (HCC)    [facility] Cuau[place]emoc Guy MD    Office Visit                      Passed - Last BP reading less than 140/90     BP Readings from Last 1 Encounters:   09/01/23 126/76               Passed - In person appointment or virtual visit in the past 6 months     Recent Outpatient Visits              4 months ago Primary hypertension    [facility] Cu[place]oc Guy MD    Office Visit    1 year ago Annual physical exam    [facility] Cu[place]emoc Guy MD    Office Visit    1 year ago Chronic a-fib ([facility])    [facility] Cuau[place]emoc Guy MD    Office Visit                        METOPROLOL SUCCINATE  MG Oral Tablet 24 Hr [Pharmacy Med Name: Metoprolol Succinate Er 24hr 100 Mg Tab Nort] 90 tablet 0     Sig: TAKE 1 TABLET BY MOUTH EVERY MORNING       Hypertensive Medications Protocol Failed - 1/24/2024 10:43 AM        Failed - CMP or BMP in past 6 months     No results found for this or any previous visit (from the past 4392 hour(s)).            Failed - EGFRCR or GFRNAA > 50     GFR Evaluation            Passed - In person appointment in the past 12 or next 3 months     Recent Outpatient Visits              4 months ago Primary hypertension    [facility], Cu[place]emoc Guy  MD    Office Visit    1 year ago Annual physical exam    [facility], [place]oc Guy MD    Office Visit    1 year ago [facility]    [facility] Visit                      Passed - Last BP reading less than 140/90     BP Readings from Last 1 Encounters:   09/01/23 126/76               Passed - In person appointment or virtual visit in the past 6 months     Recent Outpatient Visits              4 months ago Primary hypertension    [facility], Cuau[place]emoc Guy MD    Office Visit    1 year ago Annual physical exam    [facility] Cu[place] Guy MD    Office Visit    1 year ago Chronic a-fib (HCC)    [facility], Cuau[place]emoc Guy MD    Office Visit                           Recent Outpatient Visits              4 months ago Primary hypertension    [facility], Cuauhtemoc Guy MD    Office Visit    1 year ago Annual physical exam    [facility], Cuau[place]emoc Guy MD    Office Visit    1 year ago Chronic [facility]HCC)    [facility]    [facility] Visit

## 2024-01-26 RX ORDER — METOPROLOL SUCCINATE 100 MG/1
100 TABLET, EXTENDED RELEASE ORAL EVERY MORNING
Qty: 90 TABLET | Refills: 1 | Status: SHIPPED | OUTPATIENT
Start: 2024-01-26

## 2024-01-26 RX ORDER — LOSARTAN POTASSIUM 50 MG/1
50 TABLET ORAL EVERY MORNING
Qty: 90 TABLET | Refills: 1 | Status: SHIPPED | OUTPATIENT
Start: 2024-01-26

## 2024-01-26 RX ORDER — FUROSEMIDE 40 MG/1
40 TABLET ORAL EVERY MORNING
Qty: 90 TABLET | Refills: 1 | Status: SHIPPED | OUTPATIENT
Start: 2024-01-26

## 2024-02-29 NOTE — TELEPHONE ENCOUNTER
Per patient she needs refill on her rivaroxaban (XARELTO) and need to send to her mail in pharmacy.    Current Outpatient Medications   Medication Sig Dispense Refill                                rivaroxaban (XARELTO) 20 MG Oral Tab Take 1 tablet (20 mg total) by mouth daily with food. 90 tablet 1

## 2024-03-01 NOTE — TELEPHONE ENCOUNTER
Please review; protocol failed/ has no protocol    Please see message below for upcoming appointment.    Future Appointments   Date Time Provider Department Center   3/30/2024 10:15 AM Cuauhtemoc Celaya MD [facility]       Requested Prescriptions   Pending Prescriptions Disp Refills    rivaroxaban (XARELTO) 20 MG Oral Tab 90 tablet 1     Sig: Take 1 tablet (20 mg total) by mouth daily with food.       There is no refill protocol information for this order        Recent Outpatient Visits              6 months ago Primary hypertension    [facility] [place] Celaya MD    Office Visit    1 year ago Annual physical exam    [facility] Cu[place]oc Celaya MD    Office Visit    1 year ago Chronic a-fib (HCC)    [facility] [place] Celaya MD    Office Visit          Future Appointments         Provider Department Appt Notes    In 4 weeks Cuauhtemoc Celaya MD [facility] medicare px pt has only [employer] PPO

## 2024-03-26 ENCOUNTER — LAB ENCOUNTER (OUTPATIENT)
Dept: LAB | Age: 74
End: 2024-03-26
Attending: INTERNAL MEDICINE
Payer: COMMERCIAL

## 2024-03-26 DIAGNOSIS — M10.062 ACUTE IDIOPATHIC GOUT OF LEFT KNEE: ICD-10-CM

## 2024-03-26 LAB — URATE SERPL-MCNC: 6.9 MG/DL

## 2024-03-26 PROCEDURE — 84550 ASSAY OF BLOOD/URIC ACID: CPT

## 2024-03-26 PROCEDURE — 36415 COLL VENOUS BLD VENIPUNCTURE: CPT

## 2024-03-30 ENCOUNTER — OFFICE VISIT (OUTPATIENT)
Dept: INTERNAL MEDICINE CLINIC | Facility: CLINIC | Age: 74
End: 2024-03-30
Payer: COMMERCIAL

## 2024-03-30 VITALS
SYSTOLIC BLOOD PRESSURE: 130 MMHG | WEIGHT: 127 LBS | HEIGHT: 61 IN | BODY MASS INDEX: 23.98 KG/M2 | OXYGEN SATURATION: 97 % | HEART RATE: 61 BPM | DIASTOLIC BLOOD PRESSURE: 78 MMHG | TEMPERATURE: 99 F

## 2024-03-30 DIAGNOSIS — E78.2 MIXED HYPERLIPIDEMIA: ICD-10-CM

## 2024-03-30 DIAGNOSIS — Z12.11 COLON CANCER SCREENING: ICD-10-CM

## 2024-03-30 DIAGNOSIS — I48.20 CHRONIC A-FIB (HCC): ICD-10-CM

## 2024-03-30 DIAGNOSIS — M85.859 OSTEOPENIA OF NECK OF FEMUR, UNSPECIFIED LATERALITY: ICD-10-CM

## 2024-03-30 DIAGNOSIS — Z53.20 MAMMOGRAM DECLINED: ICD-10-CM

## 2024-03-30 DIAGNOSIS — I34.0 MITRAL VALVE INSUFFICIENCY, UNSPECIFIED ETIOLOGY: ICD-10-CM

## 2024-03-30 DIAGNOSIS — Z00.00 ANNUAL PHYSICAL EXAM: Primary | ICD-10-CM

## 2024-03-30 DIAGNOSIS — I10 PRIMARY HYPERTENSION: ICD-10-CM

## 2024-03-30 DIAGNOSIS — R73.01 IFG (IMPAIRED FASTING GLUCOSE): ICD-10-CM

## 2024-03-30 PROCEDURE — 3075F SYST BP GE 130 - 139MM HG: CPT | Performed by: INTERNAL MEDICINE

## 2024-03-30 PROCEDURE — 3008F BODY MASS INDEX DOCD: CPT | Performed by: INTERNAL MEDICINE

## 2024-03-30 PROCEDURE — 3078F DIAST BP <80 MM HG: CPT | Performed by: INTERNAL MEDICINE

## 2024-03-30 PROCEDURE — 99397 PER PM REEVAL EST PAT 65+ YR: CPT | Performed by: INTERNAL MEDICINE

## 2024-03-30 NOTE — PROGRESS NOTES
Subjective:     Patient ID: Laisha Prater is a 73 year old female.    Patient presents today for her annual physical. No complaints otherwise.         History/Other:   Review of Systems   Constitutional: Negative.    HENT: Negative.     Eyes: Negative.    Respiratory: Negative.           No hemoptysis   Cardiovascular:  Negative for chest pain, palpitations and leg swelling.        No pnd/orthopnea   Gastrointestinal: Negative.  Negative for abdominal pain, anal bleeding, blood in stool, constipation, diarrhea and vomiting.   Genitourinary: Negative.    Allergic/Immunologic: Negative for food allergies and immunocompromised state.   Neurological:  Negative for syncope.   Hematological: Negative.      Current Outpatient Medications   Medication Sig Dispense Refill    rivaroxaban (XARELTO) 20 MG Oral Tab Take 1 tablet (20 mg total) by mouth daily with food. 90 tablet 1    losartan 50 MG Oral Tab Take 1 tablet (50 mg total) by mouth every morning. 90 tablet 1    furosemide 40 MG Oral Tab Take 1 tablet (40 mg total) by mouth every morning. 90 tablet 1    metoprolol succinate  MG Oral Tablet 24 Hr Take 1 tablet (100 mg total) by mouth every morning. 90 tablet 1    dilTIAZem  MG Oral Capsule SR 24 Hr Take 1 capsule (240 mg total) by mouth daily. 90 capsule 1    atorvastatin 40 MG Oral Tab Take 1 tablet (40 mg total) by mouth daily. 90 tablet 3     Allergies:No Known Allergies    Past Medical History:   Diagnosis Date    Atrial fibrillation (HCC)     Essential hypertension     Hyperlipidemia     Shortness of breath       Past Surgical History:   Procedure Laterality Date          x2    HYSTERECTOMY      fibroids      Family History   Problem Relation Age of Onset    Stroke Father     Heart Disorder Father     Cancer Father         pancreatic cancer    Stroke Mother     Cancer Mother         colon cancer    Cancer Brother         cancer liver      Social History:   Social History     Socioeconomic  History    Marital status:    Tobacco Use    Smoking status: Never     Passive exposure: Never    Smokeless tobacco: Never   Vaping Use    Vaping Use: Never used   Substance and Sexual Activity    Alcohol use: Never    Drug use: Never        Objective:   Physical Exam  Constitutional:       General: She is not in acute distress.     Appearance: Normal appearance. She is well-developed. She is not ill-appearing, toxic-appearing or diaphoretic.   HENT:      Head: Normocephalic and atraumatic.      Right Ear: External ear normal.      Left Ear: External ear normal.      Nose: Nose normal.      Mouth/Throat:      Pharynx: No oropharyngeal exudate.   Eyes:      General:         Right eye: No discharge.         Left eye: No discharge.      Conjunctiva/sclera: Conjunctivae normal.      Pupils: Pupils are equal, round, and reactive to light.   Neck:      Vascular: No carotid bruit or JVD.   Cardiovascular:      Rate and Rhythm: Normal rate and regular rhythm.      Pulses: Normal pulses.      Heart sounds: Normal heart sounds. No murmur heard.     No gallop.   Pulmonary:      Effort: Pulmonary effort is normal. No respiratory distress.      Breath sounds: Normal breath sounds. No wheezing or rales.   Abdominal:      General: Abdomen is flat. Bowel sounds are normal. There is no distension.      Palpations: Abdomen is soft. There is no mass.      Tenderness: There is no abdominal tenderness. There is no guarding or rebound.      Hernia: No hernia is present.   Musculoskeletal:         General: No tenderness. Normal range of motion.      Cervical back: Normal range of motion and neck supple. No rigidity or tenderness.      Right lower leg: No edema.      Left lower leg: No edema.   Lymphadenopathy:      Cervical: No cervical adenopathy.   Skin:     General: Skin is warm and dry.      Coloration: Skin is not jaundiced or pale.      Findings: No rash.   Neurological:      Mental Status: She is alert and oriented to  person, place, and time.   Psychiatric:         Mood and Affect: Mood normal.         Assessment & Plan:   (Z00.00) Annual physical exam  (primary encounter diagnosis)  Plan: CBC With Differential With Platelet, Comp         Metabolic Panel (14), Hemoglobin A1C, Lipid         Panel, Vitamin D        Routine labs ordered. Advised RSV vaccine and shingrix ; declined latest covid booster .    (I48.20) Chronic a-fib (HCC)  Plan: pt appears to be in regular rhythm today; she likely had paroxysmal afib. She had stopped seeing her cardiologist for past 2 to 3 yrs. She remains on xarelto for stroke prevention and rate control meds.     (R73.01) IFG (impaired fasting glucose)  Plan: check fbg and A1c; cut down on carbs in diet.     (I10) Primary hypertension  Plan: controlled with current bp meds. Cpm.     (E78.2) Mixed hyperlipidemia  Plan: check lipid panel; continue with chol med and low chol diet.    (M85.859) Osteopenia of neck of femur, unspecified laterality  Plan: continue with calcijm and vit D supplement;     (Z53.20) Mammogram declined  Plan: pt again declined mammogram.     (Z12.11) Colon cancer screening  Plan: Occult Blood, Fecal, FIT Immunoassay        D/w pt again; she would want to try fit test so given collection kit.     (I34.0) Mitral valve insufficiency, unspecified etiology  Plan: I didn't her any heart murmur on exam and she remains asymptomatic. She continues to decline to ffup with cardio and also declines to get 2decho to reassess.           Orders Placed This Encounter   Procedures    CBC With Differential With Platelet    Comp Metabolic Panel (14)    Hemoglobin A1C    Lipid Panel    Vitamin D    Occult Blood, Fecal, FIT Immunoassay       Meds This Visit:  Requested Prescriptions      No prescriptions requested or ordered in this encounter       Imaging & Referrals:  None

## 2024-06-07 ENCOUNTER — PATIENT MESSAGE (OUTPATIENT)
Dept: OTHER | Age: 74
End: 2024-06-07

## 2024-06-07 RX ORDER — DILTIAZEM HYDROCHLORIDE 240 MG/1
240 CAPSULE, COATED, EXTENDED RELEASE ORAL DAILY
Qty: 90 CAPSULE | Refills: 1 | Status: SHIPPED | OUTPATIENT
Start: 2024-06-07

## 2024-06-07 NOTE — TELEPHONE ENCOUNTER
Please review. Protocol Failed; No Protocol    Message sent to patient to complete labs     Requested Prescriptions   Pending Prescriptions Disp Refills    DILTIAZEM  MG Oral Capsule SR 24 Hr [Pharmacy Med Name: Diltiazem Hydrochloride Er 24hr 240 Mg Cap Harini] 90 capsule 0     Sig: Take 1 capsule (240 mg total) by mouth daily.       Hypertension Medications Protocol Failed - 6/4/2024 10:27 AM        Failed - CMP or BMP in past 12 months        Failed - EGFRCR or GFRNAA > 50     GFR Evaluation            Passed - Last BP reading less than 140/90     BP Readings from Last 1 Encounters:   03/30/24 130/78               Passed - In person appointment or virtual visit in the past 12 mos or appointment in next 3 mos     Recent Outpatient Visits              2 months [facility]    [facility]    9 months ago [facility]    [facility] Visit    1 year ago [facility]    Office Visit    2 years ago Chronic a-fib (HCC)    [facility] Cu[place] Guy MD    Office Visit                               Recent Outpatient Visits              2 months ago Annual physical exam    [facility] Cu[place]oc Guy MD    Office Visit    9 months ago Primary hypertension    [facility] Cu[place]oc Guy MD    Office Visit    1 year ago [facility] exam    [facility]    Office Visit    2 years ago Chronic a-fib (HCC)    [facility] Cuau[place]emoc Guy MD    Office Visit

## 2024-07-05 ENCOUNTER — LAB ENCOUNTER (OUTPATIENT)
Dept: LAB | Age: 74
End: 2024-07-05
Attending: INTERNAL MEDICINE
Payer: COMMERCIAL

## 2024-07-05 DIAGNOSIS — Z00.00 ANNUAL PHYSICAL EXAM: ICD-10-CM

## 2024-07-05 LAB
ALBUMIN SERPL-MCNC: 4.6 G/DL (ref 3.2–4.8)
ALBUMIN/GLOB SERPL: 1.3 {RATIO} (ref 1–2)
ALP LIVER SERPL-CCNC: 57 U/L
ALT SERPL-CCNC: 31 U/L
ANION GAP SERPL CALC-SCNC: 8 MMOL/L (ref 0–18)
AST SERPL-CCNC: 28 U/L (ref ?–34)
BASOPHILS # BLD AUTO: 0.04 X10(3) UL (ref 0–0.2)
BASOPHILS NFR BLD AUTO: 0.4 %
BILIRUB SERPL-MCNC: 0.6 MG/DL (ref 0.2–1.1)
BUN BLD-MCNC: 15 MG/DL (ref 9–23)
BUN/CREAT SERPL: 14.2 (ref 10–20)
CALCIUM BLD-MCNC: 9.8 MG/DL (ref 8.7–10.4)
CHLORIDE SERPL-SCNC: 109 MMOL/L (ref 98–112)
CHOLEST SERPL-MCNC: 137 MG/DL (ref ?–200)
CO2 SERPL-SCNC: 27 MMOL/L (ref 21–32)
CREAT BLD-MCNC: 1.06 MG/DL
DEPRECATED RDW RBC AUTO: 45.2 FL (ref 35.1–46.3)
EGFRCR SERPLBLD CKD-EPI 2021: 55 ML/MIN/1.73M2 (ref 60–?)
EOSINOPHIL # BLD AUTO: 0.15 X10(3) UL (ref 0–0.7)
EOSINOPHIL NFR BLD AUTO: 1.7 %
ERYTHROCYTE [DISTWIDTH] IN BLOOD BY AUTOMATED COUNT: 12.9 % (ref 11–15)
EST. AVERAGE GLUCOSE BLD GHB EST-MCNC: 123 MG/DL (ref 68–126)
FASTING PATIENT LIPID ANSWER: YES
FASTING STATUS PATIENT QL REPORTED: YES
GLOBULIN PLAS-MCNC: 3.5 G/DL (ref 2–3.5)
GLUCOSE BLD-MCNC: 109 MG/DL (ref 70–99)
HBA1C MFR BLD: 5.9 % (ref ?–5.7)
HCT VFR BLD AUTO: 42.5 %
HDLC SERPL-MCNC: 42 MG/DL (ref 40–59)
HGB BLD-MCNC: 14 G/DL
IMM GRANULOCYTES # BLD AUTO: 0.01 X10(3) UL (ref 0–1)
IMM GRANULOCYTES NFR BLD: 0.1 %
LDLC SERPL CALC-MCNC: 67 MG/DL (ref ?–100)
LYMPHOCYTES # BLD AUTO: 3.64 X10(3) UL (ref 1–4)
LYMPHOCYTES NFR BLD AUTO: 40.4 %
MCH RBC QN AUTO: 31.5 PG (ref 26–34)
MCHC RBC AUTO-ENTMCNC: 32.9 G/DL (ref 31–37)
MCV RBC AUTO: 95.7 FL
MONOCYTES # BLD AUTO: 0.6 X10(3) UL (ref 0.1–1)
MONOCYTES NFR BLD AUTO: 6.7 %
NEUTROPHILS # BLD AUTO: 4.57 X10 (3) UL (ref 1.5–7.7)
NEUTROPHILS # BLD AUTO: 4.57 X10(3) UL (ref 1.5–7.7)
NEUTROPHILS NFR BLD AUTO: 50.7 %
NONHDLC SERPL-MCNC: 95 MG/DL (ref ?–130)
OSMOLALITY SERPL CALC.SUM OF ELEC: 299 MOSM/KG (ref 275–295)
PLATELET # BLD AUTO: 284 10(3)UL (ref 150–450)
POTASSIUM SERPL-SCNC: 4.6 MMOL/L (ref 3.5–5.1)
PROT SERPL-MCNC: 8.1 G/DL (ref 5.7–8.2)
RBC # BLD AUTO: 4.44 X10(6)UL
SODIUM SERPL-SCNC: 144 MMOL/L (ref 136–145)
TRIGL SERPL-MCNC: 167 MG/DL (ref 30–149)
VIT D+METAB SERPL-MCNC: 34.6 NG/ML (ref 30–100)
VLDLC SERPL CALC-MCNC: 25 MG/DL (ref 0–30)
WBC # BLD AUTO: 9 X10(3) UL (ref 4–11)

## 2024-07-05 PROCEDURE — 82306 VITAMIN D 25 HYDROXY: CPT

## 2024-07-05 PROCEDURE — 80053 COMPREHEN METABOLIC PANEL: CPT

## 2024-07-05 PROCEDURE — 80061 LIPID PANEL: CPT

## 2024-07-05 PROCEDURE — 36415 COLL VENOUS BLD VENIPUNCTURE: CPT

## 2024-07-05 PROCEDURE — 83036 HEMOGLOBIN GLYCOSYLATED A1C: CPT

## 2024-07-05 PROCEDURE — 85025 COMPLETE CBC W/AUTO DIFF WBC: CPT

## 2024-07-14 ENCOUNTER — TELEPHONE (OUTPATIENT)
Dept: INTERNAL MEDICINE CLINIC | Facility: CLINIC | Age: 74
End: 2024-07-14

## 2024-07-14 DIAGNOSIS — R79.89 ELEVATED SERUM CREATININE: Primary | ICD-10-CM

## 2024-08-01 RX ORDER — LOSARTAN POTASSIUM 50 MG/1
50 TABLET ORAL EVERY MORNING
Qty: 90 TABLET | Refills: 3 | Status: SHIPPED | OUTPATIENT
Start: 2024-08-01

## 2024-08-01 NOTE — TELEPHONE ENCOUNTER
Refill passed per [facility] protocol.  Requested Prescriptions   Pending Prescriptions Disp Refills    LOSARTAN 50 MG Oral Tab [Pharmacy Med Name: Losartan Potassium 50 Mg Tab Camb] 90 tablet 0     Sig: TAKE 1 TABLET BY MOUTH EVERY MORNING       Hypertension Medications Protocol Passed - 7/29/2024  3:11 PM        Passed - CMP or BMP in past 12 months        Passed - Last BP reading less than 140/90     BP Readings from Last 1 Encounters:   03/30/24 130/78               Passed - In person appointment or virtual visit in the past 12 mos or appointment in next 3 mos     Recent Outpatient Visits              4 months ago Annual physical exam    [facility] [place] Guy MD    Office Visit    11 months ago [facility]    1 year ago Annual physical exam    [facility] Cu[place] Guy MD    Office Visit    2 years ago [facility]HCC)    [facility]    [facility]                      Passed - EGFRCR or GFRNAA > 50     GFR Evaluation  EGFRCR: 55 , resulted on 7/5/2024             Recent Outpatient Visits              4 months ago [facility]    11 months ago Primary hypertension    [facility], [place] Guy MD    Office Visit    1 year ago Annual physical exam    [facility]    [facility]    2 years ago [facility]HCC)    [facility]    [facility]

## 2024-08-02 RX ORDER — METOPROLOL SUCCINATE 100 MG/1
100 TABLET, EXTENDED RELEASE ORAL EVERY MORNING
Qty: 90 TABLET | Refills: 3 | Status: SHIPPED | OUTPATIENT
Start: 2024-08-02

## 2024-08-02 NOTE — TELEPHONE ENCOUNTER
REFILL PASSED PER [facility] PROTOCOLS    Requested Prescriptions   Pending Prescriptions Disp Refills    metoprolol succinate  MG Oral Tablet 24 Hr [Pharmacy Med Name: Metoprolol Succinate Er 24hr 100 Mg Tab Nort] 90 tablet 3     Sig: TAKE 1 TABLET BY MOUTH EVERY MORNING       Hypertension Medications Protocol Passed - 8/2/2024 12:20 PM        Passed - CMP or BMP in past 12 months        Passed - Last BP reading less than 140/90     BP Readings from Last 1 Encounters:   03/30/24 130/78               Passed - In person appointment or virtual visit in the past 12 mos or appointment in next 3 mos     Recent Outpatient Visits              4 months ago Annual physical exam    [facility] Cu[place]oc Guy MD    Office Visit    11 months ago Primary hypertension    [facility] Cu[place]emoc Guy MD    Office Visit    1 year ago [facility]    Office Visit    2 years ago Chronic a-fib (HCC)    [facility] Cu[place]oc Guy MD    Office Visit                      Passed - EGFRCR or GFRNAA > 50     GFR Evaluation  EGFRCR: 55 , resulted on 7/5/2024                 Recent Outpatient Visits              4 months ago Annual physical exam    [facility] Cuau[place]emoc Guy MD    Office Visit    11 months ago Primary hypertension    [facility] Cuau[place]emoc Guy MD    Office Visit    1 year ago Annual physical exam    [facility] Cu[place]emoc Guy MD    Office Visit    2 years ago Chronic a-fib ([facility])    [facility] Cu[place]oc Guy MD    Office Visit

## 2024-08-29 NOTE — TELEPHONE ENCOUNTER
Protocol Failed/ No Protocol    Requested Prescriptions   Pending Prescriptions Disp Refills    XARELTO 20 MG Oral Tab [Pharmacy Med Name: XARELTO TAB 20MG] 90 tablet 1     Sig: TAKE 1 TABLET DAILY WITH   FOOD       There is no refill protocol information for this order            Recent Outpatient Visits              [facility]    12 months ago [facility]    1 year ago [facility]    [facility]    2 years ago Chronic a-[facility] (H[facility]

## 2024-10-08 RX ORDER — FUROSEMIDE 40 MG
40 TABLET ORAL EVERY MORNING
Qty: 90 TABLET | Refills: 3 | Status: SHIPPED | OUTPATIENT
Start: 2024-10-08

## 2024-10-08 NOTE — TELEPHONE ENCOUNTER
Refill Per Protocol     Requested Prescriptions   Pending Prescriptions Disp Refills    FUROSEMIDE 40 MG Oral Tab [Pharmacy Med Name: Furosemide 40 Mg Tab Risi] 90 tablet 0     Sig: TAKE 1 TABLET BY MOUTH EVERY MORNING       Hypertension Medications Protocol Passed - 10/7/2024  9:34 AM        Passed - CMP or BMP in past 12 months        Passed - Last BP reading less than 140/90     BP Readings from Last 1 Encounters:   03/30/24 130/78               Passed - In person appointment or virtual visit in the past 12 mos or appointment in next 3 mos     Recent Outpatient Visits              6 months ago Annual physical exam    [facility], Cuauhtemoc Guy MD    Office Visit    1 year ago [facility]    1 year ago Annual physical exam    [facility], Cuauhtemoc Guy MD    Office Visit    2 years ago [facility]                      Passed - EGFRCR or GFRNAA > 50     GFR Evaluation  EGFRCR: 55 , resulted on 7/5/2024                   Recent Outpatient Visits              6 months ago Annual physical exam    [facility], Cuauhtemoc Guy MD    Office Visit    1 year ago Primary hypertension    [facility], Cuauhtemoc Guy MD    Office Visit    1 year ago [facility]    [facility]    2 years ago Chronic a-fib ([facility]

## 2024-11-28 NOTE — TELEPHONE ENCOUNTER
Refill passed per [facility] protocol.    Please review pended refill request as unable to refill due to high/very high interaction warning copied here:    High  Drug-Drug: dilTIAZem ER and atorvastatinPlasma concentrations and pharmacologic effects of atorvastatin may be increased by coadministration of diltiazem. The risk of myopathy and rhabdomyolysis may be increased.    Requested Prescriptions   Pending Prescriptions Disp Refills    DILTIAZEM  MG Oral Capsule SR 24 Hr [Pharmacy Med Name: Diltiazem Hydrochloride Er 24hr 240 Mg [place]] 90 capsule 0     Sig: Take 1 capsule (240 mg total) by mouth daily.       Hypertension Medications Protocol Passed - 11/28/2024  8:05 AM        Passed - CMP or BMP in past 12 months        Passed - Last BP reading less than 140/90     BP Readings from Last 1 Encounters:   03/30/24 130/78               Passed - In person appointment or virtual visit in the past 12 mos or appointment in next 3 mos     Recent Outpatient Visits              8 [facility]    [facility]    1 year ago [facility]    [facility] Visit    2 years ago Chronic a-fib (HCC)    [facility]    Office Visit                      Passed - EGFRCR or GFRNAA > 50     GFR Evaluation  EGFRCR: 55 , resulted on 7/5/2024               Recent Outpatient Visits              8 months ago Annual physical [facility]    [facility]    1 year ago [facility]  [facility]    1 year ago Annual physical exam    [facility] [address], Cuauhtemoc Guy MD    Office Visit    2 years ago Chronic a-fib (HCC)    [facility]    [facility]

## 2024-11-29 RX ORDER — DILTIAZEM HYDROCHLORIDE 240 MG/1
240 CAPSULE, COATED, EXTENDED RELEASE ORAL DAILY
Qty: 90 CAPSULE | Refills: 0 | Status: SHIPPED | OUTPATIENT
Start: 2024-11-29

## 2024-12-10 ENCOUNTER — LAB ENCOUNTER (OUTPATIENT)
Dept: LAB | Age: 74
End: 2024-12-10
Attending: INTERNAL MEDICINE
Payer: COMMERCIAL

## 2024-12-10 DIAGNOSIS — R79.89 ELEVATED SERUM CREATININE: ICD-10-CM

## 2024-12-10 LAB
ANION GAP SERPL CALC-SCNC: 9 MMOL/L (ref 0–18)
BUN BLD-MCNC: 16 MG/DL (ref 9–23)
BUN/CREAT SERPL: 17 (ref 10–20)
CALCIUM BLD-MCNC: 9.5 MG/DL (ref 8.7–10.4)
CHLORIDE SERPL-SCNC: 106 MMOL/L (ref 98–112)
CO2 SERPL-SCNC: 27 MMOL/L (ref 21–32)
CREAT BLD-MCNC: 0.94 MG/DL
EGFRCR SERPLBLD CKD-EPI 2021: 63 ML/MIN/1.73M2 (ref 60–?)
FASTING STATUS PATIENT QL REPORTED: YES
GLUCOSE BLD-MCNC: 99 MG/DL (ref 70–99)
OSMOLALITY SERPL CALC.SUM OF ELEC: 295 MOSM/KG (ref 275–295)
POTASSIUM SERPL-SCNC: 4.2 MMOL/L (ref 3.5–5.1)
SODIUM SERPL-SCNC: 142 MMOL/L (ref 136–145)

## 2024-12-10 PROCEDURE — 36415 COLL VENOUS BLD VENIPUNCTURE: CPT

## 2024-12-10 PROCEDURE — 80048 BASIC METABOLIC PNL TOTAL CA: CPT

## 2025-02-27 NOTE — TELEPHONE ENCOUNTER
Xarelto 20 mg : was sent to [facility] on 08/29/2024 for a year supply.    [facility] is requesting.

## 2025-02-27 NOTE — TELEPHONE ENCOUNTER
Please review. Protocol Failed; No Protocol    Patient is requesting refills to [facility]

## 2025-02-28 RX ORDER — DILTIAZEM HYDROCHLORIDE 240 MG/1
240 CAPSULE, COATED, EXTENDED RELEASE ORAL DAILY
Qty: 30 CAPSULE | Refills: 0 | Status: SHIPPED | OUTPATIENT
Start: 2025-02-28

## 2025-02-28 NOTE — TELEPHONE ENCOUNTER
Please kindly review this medication    [x] FAILS PROTOCOL    [] HAS NO PROTOCOL ATTACHED    Patient has yet to schedule a follow up appointment.    Patient was asked to schedule 11/29/2024 and was provided a 90 day supply.    webtide message sent to patient to schedule an office visit with PCP.   Routed to Call Center to call patient and make an appointment.

## 2025-02-28 NOTE — TELEPHONE ENCOUNTER
Please call patient to assist in making an appointment. Thank you     **Please attempt all available phone numbers in patient's chart. This includes alternative numbers and contacts on patient's release of information. Thank you.

## 2025-04-03 ENCOUNTER — OFFICE VISIT (OUTPATIENT)
Dept: INTERNAL MEDICINE CLINIC | Facility: CLINIC | Age: 75
End: 2025-04-03

## 2025-04-03 VITALS
BODY MASS INDEX: 23.67 KG/M2 | DIASTOLIC BLOOD PRESSURE: 80 MMHG | HEART RATE: 60 BPM | SYSTOLIC BLOOD PRESSURE: 128 MMHG | HEIGHT: 61 IN | WEIGHT: 125.38 LBS

## 2025-04-03 DIAGNOSIS — M85.89 OSTEOPENIA OF MULTIPLE SITES: ICD-10-CM

## 2025-04-03 DIAGNOSIS — I48.20 CHRONIC A-FIB (HCC): ICD-10-CM

## 2025-04-03 DIAGNOSIS — I34.0 MITRAL VALVE INSUFFICIENCY, UNSPECIFIED ETIOLOGY: ICD-10-CM

## 2025-04-03 DIAGNOSIS — E55.9 VITAMIN D DEFICIENCY: ICD-10-CM

## 2025-04-03 DIAGNOSIS — Z00.00 MEDICARE ANNUAL WELLNESS VISIT, SUBSEQUENT: Primary | ICD-10-CM

## 2025-04-03 DIAGNOSIS — I10 PRIMARY HYPERTENSION: ICD-10-CM

## 2025-04-03 DIAGNOSIS — M85.859 OSTEOPENIA OF NECK OF FEMUR, UNSPECIFIED LATERALITY: ICD-10-CM

## 2025-04-03 DIAGNOSIS — R73.01 IFG (IMPAIRED FASTING GLUCOSE): ICD-10-CM

## 2025-04-03 DIAGNOSIS — E78.2 MIXED HYPERLIPIDEMIA: ICD-10-CM

## 2025-04-03 RX ORDER — FUROSEMIDE 40 MG/1
40 TABLET ORAL EVERY MORNING
Qty: 90 TABLET | Refills: 3 | Status: SHIPPED | OUTPATIENT
Start: 2025-04-03

## 2025-04-03 RX ORDER — ATORVASTATIN CALCIUM 40 MG/1
40 TABLET, FILM COATED ORAL DAILY
Qty: 90 TABLET | Refills: 3 | Status: SHIPPED | OUTPATIENT
Start: 2025-04-03

## 2025-04-03 RX ORDER — LOSARTAN POTASSIUM 50 MG/1
50 TABLET ORAL EVERY MORNING
Qty: 90 TABLET | Refills: 3 | Status: SHIPPED | OUTPATIENT
Start: 2025-04-03

## 2025-04-03 RX ORDER — DILTIAZEM HYDROCHLORIDE 240 MG/1
240 CAPSULE, COATED, EXTENDED RELEASE ORAL DAILY
Qty: 90 CAPSULE | Refills: 3 | Status: SHIPPED | OUTPATIENT
Start: 2025-04-03

## 2025-04-03 RX ORDER — METOPROLOL SUCCINATE 100 MG/1
100 TABLET, EXTENDED RELEASE ORAL EVERY MORNING
Qty: 90 TABLET | Refills: 3 | Status: SHIPPED | OUTPATIENT
Start: 2025-04-03

## 2025-04-03 NOTE — PROGRESS NOTES
Subjective:   Laisha Prater is a 74 year old female who presents for a Initial Annual Wellness Visit (outside the first 12 months of Medicare eligibility, no prior AWV) and scheduled follow up of multiple significant but stable problems.       History/Other:   Fall Risk Assessment:   She has been screened for Falls and is low risk.      Cognitive Assessment:   She had a completely normal cognitive assessment - see flowsheet entries     Functional Ability/Status:   Laisha Prater has some abnormal functions as listed below:  She has Hearing problems based on screening of functional status.      Depression Screening (PHQ):  PHQ-2 SCORE: 0  , done 4/3/2025            Advanced Directives:   She does NOT have a Living Will. [Do you have a living will?: No]  She does NOT have a Power of  for Health Care. [Do you have a healthcare power of ?: No]  Discussed Advance Care Planning with patient (and family/surrogate if present). Standard forms made available to patient in After Visit Summary.      Patient Active Problem List   Diagnosis    Atrial fibrillation with rapid ventricular response (HCC)    Elevated LFTs    Chronic a-fib (HCC)    Primary hypertension    Mixed hyperlipidemia    IFG (impaired fasting glucose)    Colon cancer screening declined    Medicare annual wellness visit, subsequent    Osteopenia of multiple sites    Mitral valve insufficiency    Vitamin D deficiency     Allergies:  She has No Known Allergies.    Current Medications:  Outpatient Medications Marked as Taking for the 4/3/25 encounter (Office Visit) with Cuauhtemoc Celaya MD   Medication Sig    atorvastatin 40 MG Oral Tab Take 1 tablet (40 mg total) by mouth daily.    dilTIAZem  MG Oral Capsule SR 24 Hr Take 1 capsule (240 mg total) by mouth daily. **Overdue for a follow up appointment - no further refills    furosemide 40 MG Oral Tab Take 1 tablet (40 mg total) by mouth every morning.    metoprolol succinate  MG  Oral Tablet 24 Hr Take 1 tablet (100 mg total) by mouth every morning.    losartan 50 MG Oral Tab Take 1 tablet (50 mg total) by mouth every morning.    rivaroxaban (XARELTO) 20 MG Oral Tab Take 1 tablet (20 mg total) by mouth daily with food.       Medical History:  She  has a past medical history of Atrial fibrillation (HCC), Essential hypertension, Hyperlipidemia, and Shortness of breath.  Surgical History:  She  has a past surgical history that includes  and hysterectomy.   Family History:  Her family history includes Cancer in her brother, father, and mother; Heart Disorder in her father; Hypertension in her sister; Stroke in her father and mother.  Social History:  She  reports that she has never smoked. She has never been exposed to tobacco smoke. She has never used smokeless tobacco. She reports that she does not drink alcohol and does not use drugs.    Tobacco:  She has never smoked tobacco.    CAGE Alcohol Screen:   CAGE screening score of 0 on 4/3/2025, showing low risk of alcohol abuse.      Patient Care Team:  Cuauhtemoc Celaya MD as PCP - General (Internal Medicine)    Review of Systems  GENERAL: feels well otherwise  SKIN: denies any unusual skin lesions  EYES: denies blurred vision or double vision  HEENT: denies nasal congestion, sinus pain or ST  LUNGS: denies shortness of breath with exertion  CARDIOVASCULAR: denies chest pain on exertion; no palpitation; no syncope;   GI: denies abdominal pain, denies heartburn; no hematochezia/hematemeisis  : denies dysuria, vaginal discharge or itching, no complaint of urinary incontinence ; nohematuria  MUSCULOSKELETAL: denies back pain  NEURO: denies headaches  PSYCHE: denies depression or anxiety  HEMATOLOGIC: denies hx of anemia  ENDOCRINE: denies thyroid history  ALL/ASTHMA: denies hx of allergy or asthma    Objective:   Physical Exam  General Appearance:  Alert, cooperative, no distress, appears stated age   Head:  Normocephalic, without  obvious abnormality, atraumatic   Eyes:  PERRL, conjunctiva/corneas clear, EOM's intact both eyes   Ears:  Normal TM's and external ear canals, both ears   Nose: Nares normal, septum midline,mucosa normal, no drainage or sinus tenderness   Throat: Lips, mucosa, and tongue normal; teeth and gums normal   Neck: Supple, symmetrical, trachea midline, no adenopathy;  thyroid: not enlarged, symmetric, no tenderness/mass/nodules; no carotid bruit or JVD   Back:   Symmetric, no curvature, ROM normal, no CVA tenderness   Lungs:   Clear to auscultation bilaterally, respirations unlabored   Heart:  Regular rate and rhythm, S1 and S2 normal, no murmur, rub, or gallop   Abdomen:   Soft, non-tender, bowel sounds active all four quadrants,  no masses, no organomegaly   Pelvic: Deferred   Extremities: Extremities normal, atraumatic, no cyanosis or edema   Pulses: 2+ and symmetric   Skin: Skin color, texture, turgor normal, no rashes or lesions   Lymph nodes: Cervical, supraclavicular, nodes normal   Neurologic: Normal       /80   Pulse 60   Ht 5' 1\" (1.549 m)   Wt 125 lb 6 oz (56.9 kg)   BMI 23.69 kg/m²  Estimated body mass index is 23.69 kg/m² as calculated from the following:    Height as of this encounter: 5' 1\" (1.549 m).    Weight as of this encounter: 125 lb 6 oz (56.9 kg).    Medicare Hearing Assessment:   Hearing Screening    Screening Method: Finger Rub  Finger Rub Result: Pass         Visual Acuity:   Right Eye Visual Acuity: Uncorrected Right Eye Chart Acuity: 20/30   Left Eye Visual Acuity: Uncorrected Left Eye Chart Acuity: 20/200   Both Eyes Visual Acuity: Uncorrected Both Eyes Chart Acuity: 20/40   Able To Tolerate Visual Acuity: Yes        Assessment & Plan:   Laisha Prater is a 74 year old female who presents for a Medicare Assessment.     (Z00.00) Medicare annual wellness visit, subsequent  (primary encounter diagnosis)  Plan: CBC With Differential With Platelet, TSH W         Reflex To Free T4         Routine labs ordered;  pt adfised to get her shingrix vaccine and covid booster.     (I10) Primary hypertension  Plan: bp controlled. Cpm .    (E78.2) Mixed hyperlipidemia  Plan: Comp Metabolic Panel (14), Lipid Panel        Check lipid panel; continue with chol med    (I48.20) Chronic a-fib (HCC)  Plan: pt rate controlled and pt on xarelto for her stroke prevention.     (R73.01) IFG (impaired fasting glucose)  Plan: Hemoglobin A1C        Check fbg and A1c;         (I34.0) Mitral valve insufficiency, unspecified etiology  Plan: pt had hx of MR, and TR several years ago, has not had ffup 2decho since pt refused and exam I dont hear much of heart murmur however I told pt again that should objectively be check with 2decho but pt agai refused.     (E55.9) Vitamin D deficiency  Plan: Vitamin D        Check vit D; ocntinue with vit D supplement .    (M85.89) Osteopenia of multiple sites  Plan: XR DEXA BONE DENSITOMETRY (CPT=77080)        Pt given order for dexa scan; continue with calcuim with vit D .     The patient indicates understanding of these issues and agrees to the plan.  Reinforced healthy diet, lifestyle, and exercise.      No follow-ups on file.     Cuauhtemoc Celaya MD, 4/3/2025     Supplementary Documentation:   General Health:  In the past six months, have you lost more than 10 pounds without trying?: 3 - Don't know  Has your appetite been poor?: No  Type of Diet: Balanced  How does the patient maintain a good energy level?: Appropriate Exercise  How would you describe your daily physical activity?: Moderate  How would you describe your current health state?: Good  How do you maintain positive mental well-being?: Puzzles  On a scale of 0 to 10, with 0 being no pain and 10 being severe pain, what is your pain level?: 1 - (Mild)  In the past six months, have you experienced urine leakage?: 0-No  At any time do you feel concerned for the safety/well-being of yourself and/or your children, in your home or  elsewhere?: Yes  Have you had any immunizations at another office such as Influenza, Hepatitis B, Tetanus, or Pneumococcal?: No    Health Maintenance   Topic Date Due    Zoster Vaccines (1 of 2) Never done    COVID-19 Vaccine (6 - 2024-25 season) 04/25/2025    HTN: BP Follow-Up  05/03/2025    Annual Physical  04/03/2026    Influenza Vaccine  Completed    DEXA Scan  Completed    Annual Depression Screening  Completed    Fall Risk Screening (Annual)  Completed    Pneumococcal Vaccine: 50+ Years  Completed    Meningococcal B Vaccine  Aged Out

## 2025-04-04 RX ORDER — METOPROLOL SUCCINATE 100 MG/1
100 TABLET, EXTENDED RELEASE ORAL EVERY MORNING
Qty: 90 TABLET | Refills: 3 | OUTPATIENT
Start: 2025-04-04

## 2025-04-04 RX ORDER — DILTIAZEM HYDROCHLORIDE 240 MG/1
240 CAPSULE, COATED, EXTENDED RELEASE ORAL DAILY
Qty: 30 CAPSULE | Refills: 0 | OUTPATIENT
Start: 2025-04-04

## 2025-04-04 RX ORDER — LOSARTAN POTASSIUM 50 MG/1
50 TABLET ORAL EVERY MORNING
Qty: 90 TABLET | Refills: 3 | OUTPATIENT
Start: 2025-04-04

## 2025-04-04 RX ORDER — ATORVASTATIN CALCIUM 40 MG/1
40 TABLET, FILM COATED ORAL DAILY
Qty: 90 TABLET | Refills: 3 | OUTPATIENT
Start: 2025-04-04

## 2025-04-04 RX ORDER — FUROSEMIDE 40 MG/1
40 TABLET ORAL EVERY MORNING
Qty: 90 TABLET | Refills: 3 | OUTPATIENT
Start: 2025-04-04

## 2025-04-04 NOTE — TELEPHONE ENCOUNTER
Atorvastatin 40m year supply sent to [facility] in [place] on 2025    Losartan 50m year supply sent to [facility] in [place] on 2025    Metoprolol ER 100m year supply sent to [facility] in [place] on 2025    Furosemide 40m year supply sent to [facility] in [place] on 2025    Diltiazem ER 240m year supply sent to [facility] in [place] on 2025

## 2025-04-06 PROBLEM — M85.859 OSTEOPENIA OF NECK OF FEMUR: Status: ACTIVE | Noted: 2025-04-06

## 2025-04-06 PROBLEM — E55.9 VITAMIN D DEFICIENCY: Status: ACTIVE | Noted: 2025-04-06

## 2025-04-06 PROBLEM — Z00.00 MEDICARE ANNUAL WELLNESS VISIT, SUBSEQUENT: Status: ACTIVE | Noted: 2025-04-06

## 2025-04-06 PROBLEM — I34.0 MITRAL VALVE INSUFFICIENCY: Status: ACTIVE | Noted: 2025-04-06

## 2025-04-06 PROBLEM — M85.89 OSTEOPENIA OF MULTIPLE SITES: Status: ACTIVE | Noted: 2025-04-06
